# Patient Record
Sex: MALE | Race: WHITE | Employment: FULL TIME
[De-identification: names, ages, dates, MRNs, and addresses within clinical notes are randomized per-mention and may not be internally consistent; named-entity substitution may affect disease eponyms.]

---

## 2016-12-29 LAB
BUN BLDV-MCNC: 14 MG/DL
CALCIUM SERPL-MCNC: 9.8 MG/DL
CHLORIDE BLD-SCNC: 95 MMOL/L
CHOLESTEROL, TOTAL: 189 MG/DL
CHOLESTEROL/HDL RATIO: 3
CO2: 26 MMOL/L
CREAT SERPL-MCNC: 0.73 MG/DL
GFR CALCULATED: 60
GLUCOSE BLD-MCNC: 83 MG/DL
HDLC SERPL-MCNC: 64 MG/DL (ref 35–70)
LDL CHOLESTEROL CALCULATED: 102 MG/DL (ref 0–160)
POTASSIUM SERPL-SCNC: 3.6 MMOL/L
SODIUM BLD-SCNC: 137 MMOL/L
TRIGL SERPL-MCNC: 114 MG/DL
TSH SERPL DL<=0.05 MIU/L-ACNC: 0.12 UIU/ML
VLDLC SERPL CALC-MCNC: 23 MG/DL

## 2017-01-25 ENCOUNTER — OFFICE VISIT (OUTPATIENT)
Dept: FAMILY MEDICINE CLINIC | Facility: CLINIC | Age: 52
End: 2017-01-25

## 2017-01-25 VITALS
HEIGHT: 68 IN | SYSTOLIC BLOOD PRESSURE: 140 MMHG | HEART RATE: 70 BPM | BODY MASS INDEX: 29.25 KG/M2 | WEIGHT: 193 LBS | DIASTOLIC BLOOD PRESSURE: 90 MMHG | RESPIRATION RATE: 18 BRPM

## 2017-01-25 DIAGNOSIS — E78.2 MIXED HYPERLIPIDEMIA: ICD-10-CM

## 2017-01-25 DIAGNOSIS — E05.90 HYPERTHYROIDISM: ICD-10-CM

## 2017-01-25 DIAGNOSIS — I10 ESSENTIAL HYPERTENSION: Primary | ICD-10-CM

## 2017-01-25 DIAGNOSIS — M62.830 BACK SPASM: ICD-10-CM

## 2017-01-25 DIAGNOSIS — Z71.6 ENCOUNTER FOR SMOKING CESSATION COUNSELING: ICD-10-CM

## 2017-01-25 PROCEDURE — 99214 OFFICE O/P EST MOD 30 MIN: CPT | Performed by: NURSE PRACTITIONER

## 2017-01-25 RX ORDER — BENAZEPRIL HYDROCHLORIDE AND HYDROCHLOROTHIAZIDE 20; 12.5 MG/1; MG/1
1 TABLET ORAL DAILY
Qty: 180 TABLET | Refills: 1 | Status: SHIPPED | OUTPATIENT
Start: 2017-01-25 | End: 2017-01-25 | Stop reason: SDUPTHER

## 2017-01-25 RX ORDER — VARENICLINE TARTRATE 25 MG
KIT ORAL
Qty: 1 EACH | Refills: 0 | Status: SHIPPED | OUTPATIENT
Start: 2017-01-25 | End: 2017-03-01 | Stop reason: DRUGHIGH

## 2017-01-25 RX ORDER — IBUPROFEN 800 MG/1
800 TABLET ORAL EVERY 8 HOURS PRN
Qty: 180 TABLET | Refills: 1 | Status: SHIPPED | OUTPATIENT
Start: 2017-01-25 | End: 2017-05-25 | Stop reason: SDUPTHER

## 2017-01-25 RX ORDER — BENAZEPRIL HYDROCHLORIDE AND HYDROCHLOROTHIAZIDE 20; 12.5 MG/1; MG/1
1 TABLET ORAL 2 TIMES DAILY
Qty: 180 TABLET | Refills: 1 | Status: SHIPPED | OUTPATIENT
Start: 2017-01-25 | End: 2017-07-06 | Stop reason: SDUPTHER

## 2017-01-25 RX ORDER — METHOCARBAMOL 750 MG/1
750 TABLET, FILM COATED ORAL DAILY
Qty: 90 TABLET | Refills: 1 | Status: SHIPPED | OUTPATIENT
Start: 2017-01-25 | End: 2019-01-07 | Stop reason: SDUPTHER

## 2017-01-25 ASSESSMENT — ENCOUNTER SYMPTOMS
SHORTNESS OF BREATH: 0
EYES NEGATIVE: 1
ABDOMINAL DISTENTION: 0
CHEST TIGHTNESS: 0
COUGH: 0

## 2017-02-07 ENCOUNTER — TELEPHONE (OUTPATIENT)
Dept: FAMILY MEDICINE CLINIC | Facility: CLINIC | Age: 52
End: 2017-02-07

## 2017-03-01 ENCOUNTER — TELEPHONE (OUTPATIENT)
Dept: FAMILY MEDICINE CLINIC | Facility: CLINIC | Age: 52
End: 2017-03-01

## 2017-03-01 RX ORDER — VARENICLINE TARTRATE 1 MG/1
1 TABLET, FILM COATED ORAL 2 TIMES DAILY
Qty: 60 TABLET | Refills: 1 | Status: SHIPPED | OUTPATIENT
Start: 2017-03-01 | End: 2019-01-07

## 2017-03-20 ENCOUNTER — HOSPITAL ENCOUNTER (EMERGENCY)
Age: 52
Discharge: HOME | End: 2017-03-20
Attending: EMERGENCY MEDICINE
Payer: COMMERCIAL

## 2017-03-20 ENCOUNTER — APPOINTMENT (OUTPATIENT)
Dept: GENERAL RADIOLOGY | Age: 52
End: 2017-03-20
Payer: COMMERCIAL

## 2017-03-20 VITALS
SYSTOLIC BLOOD PRESSURE: 191 MMHG | WEIGHT: 191 LBS | TEMPERATURE: 98.4 F | OXYGEN SATURATION: 100 % | DIASTOLIC BLOOD PRESSURE: 104 MMHG | BODY MASS INDEX: 29.98 KG/M2 | HEIGHT: 67 IN | HEART RATE: 76 BPM | RESPIRATION RATE: 16 BRPM

## 2017-03-20 DIAGNOSIS — R07.81 RIB PAIN ON LEFT SIDE: Primary | ICD-10-CM

## 2017-03-20 LAB — D-DIMER QUANTITATIVE: 0.39 MG/L FEU (ref 0–0.5)

## 2017-03-20 PROCEDURE — 96372 THER/PROPH/DIAG INJ SC/IM: CPT

## 2017-03-20 PROCEDURE — 85379 FIBRIN DEGRADATION QUANT: CPT

## 2017-03-20 PROCEDURE — 99285 EMERGENCY DEPT VISIT HI MDM: CPT

## 2017-03-20 PROCEDURE — 6360000002 HC RX W HCPCS: Performed by: EMERGENCY MEDICINE

## 2017-03-20 PROCEDURE — 71020 XR CHEST STANDARD TWO VW: CPT

## 2017-03-20 PROCEDURE — 36415 COLL VENOUS BLD VENIPUNCTURE: CPT

## 2017-03-20 RX ORDER — KETOROLAC TROMETHAMINE 30 MG/ML
30 INJECTION, SOLUTION INTRAMUSCULAR; INTRAVENOUS ONCE
Status: COMPLETED | OUTPATIENT
Start: 2017-03-20 | End: 2017-03-20

## 2017-03-20 RX ORDER — OXYCODONE HYDROCHLORIDE AND ACETAMINOPHEN 5; 325 MG/1; MG/1
1 TABLET ORAL EVERY 6 HOURS PRN
Qty: 4 TABLET | Refills: 0 | Status: SHIPPED | OUTPATIENT
Start: 2017-03-20 | End: 2017-03-21

## 2017-03-20 RX ADMIN — KETOROLAC TROMETHAMINE 30 MG: 30 INJECTION, SOLUTION INTRAMUSCULAR at 15:34

## 2017-03-20 ASSESSMENT — ENCOUNTER SYMPTOMS
HEARTBURN: 0
EYES NEGATIVE: 1
NAUSEA: 0
COUGH: 0
BACK PAIN: 0
VOMITING: 0
STRIDOR: 0
CHOKING: 0
WHEEZING: 0
ALLERGIC/IMMUNOLOGIC NEGATIVE: 1
APNEA: 0
CHEST TIGHTNESS: 0
SHORTNESS OF BREATH: 0
TROUBLE SWALLOWING: 0
ABDOMINAL PAIN: 0
ORTHOPNEA: 0
GASTROINTESTINAL NEGATIVE: 1

## 2017-03-20 ASSESSMENT — PAIN DESCRIPTION - LOCATION: LOCATION: RIB CAGE

## 2017-03-20 ASSESSMENT — PAIN SCALES - GENERAL
PAINLEVEL_OUTOF10: 7
PAINLEVEL_OUTOF10: 6
PAINLEVEL_OUTOF10: 6

## 2017-03-20 ASSESSMENT — PAIN DESCRIPTION - PAIN TYPE: TYPE: ACUTE PAIN

## 2017-03-20 ASSESSMENT — PAIN DESCRIPTION - FREQUENCY: FREQUENCY: CONTINUOUS

## 2017-03-20 ASSESSMENT — PAIN DESCRIPTION - PROGRESSION: CLINICAL_PROGRESSION: NOT CHANGED

## 2017-03-20 ASSESSMENT — PAIN DESCRIPTION - ORIENTATION: ORIENTATION: LEFT

## 2017-03-20 ASSESSMENT — PAIN DESCRIPTION - DESCRIPTORS: DESCRIPTORS: ACHING

## 2017-04-18 ENCOUNTER — OFFICE VISIT (OUTPATIENT)
Dept: FAMILY MEDICINE CLINIC | Age: 52
End: 2017-04-18
Payer: COMMERCIAL

## 2017-04-18 VITALS
WEIGHT: 192 LBS | DIASTOLIC BLOOD PRESSURE: 80 MMHG | HEIGHT: 68 IN | HEART RATE: 68 BPM | BODY MASS INDEX: 29.1 KG/M2 | RESPIRATION RATE: 18 BRPM | SYSTOLIC BLOOD PRESSURE: 130 MMHG

## 2017-04-18 DIAGNOSIS — R07.81 RIB PAIN ON LEFT SIDE: ICD-10-CM

## 2017-04-18 DIAGNOSIS — R07.89 COSTOCHONDRAL CHEST PAIN: Primary | ICD-10-CM

## 2017-04-18 PROCEDURE — 99213 OFFICE O/P EST LOW 20 MIN: CPT | Performed by: NURSE PRACTITIONER

## 2017-04-18 ASSESSMENT — ENCOUNTER SYMPTOMS
ABDOMINAL DISTENTION: 0
COUGH: 0
EYES NEGATIVE: 1

## 2017-10-26 ENCOUNTER — TELEPHONE (OUTPATIENT)
Dept: PRIMARY CARE CLINIC | Age: 52
End: 2017-10-26

## 2018-01-03 DIAGNOSIS — I10 ESSENTIAL HYPERTENSION: ICD-10-CM

## 2018-01-03 RX ORDER — BENAZEPRIL HYDROCHLORIDE AND HYDROCHLOROTHIAZIDE 20; 12.5 MG/1; MG/1
TABLET ORAL
Qty: 180 TABLET | Refills: 1 | Status: SHIPPED | OUTPATIENT
Start: 2018-01-03 | End: 2018-07-02 | Stop reason: SDUPTHER

## 2018-07-02 DIAGNOSIS — I10 ESSENTIAL HYPERTENSION: ICD-10-CM

## 2018-07-02 RX ORDER — BENAZEPRIL HYDROCHLORIDE AND HYDROCHLOROTHIAZIDE 20; 12.5 MG/1; MG/1
TABLET ORAL
Qty: 180 TABLET | Refills: 1 | Status: SHIPPED | OUTPATIENT
Start: 2018-07-02 | End: 2019-01-07 | Stop reason: SDUPTHER

## 2019-01-07 ENCOUNTER — OFFICE VISIT (OUTPATIENT)
Dept: FAMILY MEDICINE CLINIC | Age: 54
End: 2019-01-07
Payer: COMMERCIAL

## 2019-01-07 VITALS
TEMPERATURE: 98.7 F | HEART RATE: 84 BPM | DIASTOLIC BLOOD PRESSURE: 110 MMHG | WEIGHT: 209 LBS | HEIGHT: 68 IN | OXYGEN SATURATION: 97 % | BODY MASS INDEX: 31.67 KG/M2 | SYSTOLIC BLOOD PRESSURE: 170 MMHG

## 2019-01-07 DIAGNOSIS — Z12.11 ENCOUNTER FOR SCREENING COLONOSCOPY: ICD-10-CM

## 2019-01-07 DIAGNOSIS — M62.830 BACK SPASM: ICD-10-CM

## 2019-01-07 DIAGNOSIS — D23.30 DERMOID CYST OF FACE: ICD-10-CM

## 2019-01-07 DIAGNOSIS — I10 ESSENTIAL HYPERTENSION: ICD-10-CM

## 2019-01-07 DIAGNOSIS — J20.9 BRONCHITIS, ACUTE, WITH BRONCHOSPASM: Primary | ICD-10-CM

## 2019-01-07 PROCEDURE — 99214 OFFICE O/P EST MOD 30 MIN: CPT | Performed by: NURSE PRACTITIONER

## 2019-01-07 RX ORDER — AMOXICILLIN AND CLAVULANATE POTASSIUM 875; 125 MG/1; MG/1
1 TABLET, FILM COATED ORAL 2 TIMES DAILY
Qty: 20 TABLET | Refills: 0 | Status: SHIPPED | OUTPATIENT
Start: 2019-01-07 | End: 2019-01-17

## 2019-01-07 RX ORDER — MELOXICAM 15 MG/1
15 TABLET ORAL DAILY
Qty: 30 TABLET | Refills: 0 | Status: SHIPPED | OUTPATIENT
Start: 2019-01-07 | End: 2019-04-11 | Stop reason: SDUPTHER

## 2019-01-07 RX ORDER — METHOCARBAMOL 750 MG/1
750 TABLET, FILM COATED ORAL DAILY
Qty: 90 TABLET | Refills: 1 | Status: SHIPPED | OUTPATIENT
Start: 2019-01-07 | End: 2020-12-17 | Stop reason: SDUPTHER

## 2019-01-07 RX ORDER — AMLODIPINE BESYLATE 5 MG/1
5 TABLET ORAL DAILY
Qty: 30 TABLET | Refills: 1 | Status: SHIPPED | OUTPATIENT
Start: 2019-01-07 | End: 2019-04-11 | Stop reason: SDUPTHER

## 2019-01-07 RX ORDER — BENAZEPRIL HYDROCHLORIDE AND HYDROCHLOROTHIAZIDE 20; 12.5 MG/1; MG/1
1 TABLET ORAL DAILY
Qty: 90 TABLET | Refills: 1 | Status: SHIPPED | OUTPATIENT
Start: 2019-01-07 | End: 2019-07-15 | Stop reason: SDUPTHER

## 2019-01-08 ASSESSMENT — ENCOUNTER SYMPTOMS
COUGH: 0
ABDOMINAL DISTENTION: 0
CHEST TIGHTNESS: 0
EYES NEGATIVE: 1
SORE THROAT: 0
RHINORRHEA: 0

## 2019-02-21 LAB
ALT SERPL-CCNC: 113 U/L
AST SERPL-CCNC: 81 U/L
AVERAGE GLUCOSE: 105
BASOPHILS ABSOLUTE: NORMAL /ΜL
BASOPHILS RELATIVE PERCENT: NORMAL %
BUN BLDV-MCNC: 13 MG/DL
CALCIUM SERPL-MCNC: 9.7 MG/DL
CHLORIDE BLD-SCNC: 100 MMOL/L
CHOLESTEROL, TOTAL: 269 MG/DL
CHOLESTEROL/HDL RATIO: 5.6
CO2: 26 MMOL/L
CREAT SERPL-MCNC: 0.82 MG/DL
EOSINOPHILS ABSOLUTE: NORMAL /ΜL
EOSINOPHILS RELATIVE PERCENT: NORMAL %
GFR CALCULATED: ABNORMAL
GLUCOSE BLD-MCNC: 115 MG/DL
HBA1C MFR BLD: 5.3 %
HCT VFR BLD CALC: 48.6 % (ref 41–53)
HDLC SERPL-MCNC: 48 MG/DL (ref 35–70)
HEMOGLOBIN: 16.4 G/DL (ref 13.5–17.5)
LDL CHOLESTEROL CALCULATED: 185 MG/DL (ref 0–160)
LYMPHOCYTES ABSOLUTE: NORMAL /ΜL
LYMPHOCYTES RELATIVE PERCENT: NORMAL %
MCH RBC QN AUTO: 30.5 PG
MCHC RBC AUTO-ENTMCNC: 33.7 G/DL
MCV RBC AUTO: 90.6 FL
MONOCYTES ABSOLUTE: NORMAL /ΜL
MONOCYTES RELATIVE PERCENT: NORMAL %
NEUTROPHILS ABSOLUTE: NORMAL /ΜL
NEUTROPHILS RELATIVE PERCENT: NORMAL %
PLATELET # BLD: 259 K/ΜL
PMV BLD AUTO: NORMAL FL
POTASSIUM SERPL-SCNC: 4.2 MMOL/L
PROSTATE SPECIFIC ANTIGEN: 1.55 NG/ML
RBC # BLD: NORMAL 10^6/ΜL
SODIUM BLD-SCNC: 139 MMOL/L
TRIGL SERPL-MCNC: 180 MG/DL
TSH SERPL DL<=0.05 MIU/L-ACNC: 0.61 UIU/ML
VLDLC SERPL CALC-MCNC: ABNORMAL MG/DL
WBC # BLD: 6.7 10^3/ML

## 2019-04-10 ENCOUNTER — TELEPHONE (OUTPATIENT)
Dept: FAMILY MEDICINE CLINIC | Age: 54
End: 2019-04-10

## 2019-04-11 RX ORDER — MELOXICAM 15 MG/1
15 TABLET ORAL DAILY
Qty: 90 TABLET | Refills: 0 | Status: SHIPPED | OUTPATIENT
Start: 2019-04-11 | End: 2019-07-15

## 2019-04-11 RX ORDER — AMLODIPINE BESYLATE 5 MG/1
5 TABLET ORAL DAILY
Qty: 90 TABLET | Refills: 1 | Status: SHIPPED | OUTPATIENT
Start: 2019-04-11 | End: 2019-07-16 | Stop reason: SDUPTHER

## 2019-06-21 ENCOUNTER — TELEPHONE (OUTPATIENT)
Dept: PRIMARY CARE CLINIC | Age: 54
End: 2019-06-21

## 2019-06-21 DIAGNOSIS — Z00.00 WELLNESS EXAMINATION: Primary | ICD-10-CM

## 2019-06-21 DIAGNOSIS — Z00.00 WELLNESS EXAMINATION: ICD-10-CM

## 2019-07-15 ENCOUNTER — HOSPITAL ENCOUNTER (OUTPATIENT)
Dept: GENERAL RADIOLOGY | Age: 54
Discharge: HOME | End: 2019-07-17
Payer: COMMERCIAL

## 2019-07-15 ENCOUNTER — HOSPITAL ENCOUNTER (OUTPATIENT)
Age: 54
Discharge: HOME | End: 2019-07-17
Payer: COMMERCIAL

## 2019-07-15 ENCOUNTER — HOSPITAL ENCOUNTER (OUTPATIENT)
Age: 54
Discharge: HOME | End: 2019-07-15
Payer: COMMERCIAL

## 2019-07-15 ENCOUNTER — OFFICE VISIT (OUTPATIENT)
Dept: FAMILY MEDICINE CLINIC | Age: 54
End: 2019-07-15
Payer: COMMERCIAL

## 2019-07-15 VITALS
BODY MASS INDEX: 30.96 KG/M2 | TEMPERATURE: 98.8 F | WEIGHT: 203.6 LBS | SYSTOLIC BLOOD PRESSURE: 180 MMHG | HEART RATE: 82 BPM | OXYGEN SATURATION: 96 % | DIASTOLIC BLOOD PRESSURE: 100 MMHG

## 2019-07-15 DIAGNOSIS — M51.36 DDD (DEGENERATIVE DISC DISEASE), LUMBAR: ICD-10-CM

## 2019-07-15 DIAGNOSIS — M54.41 MIDLINE LOW BACK PAIN WITH BILATERAL SCIATICA, UNSPECIFIED CHRONICITY: ICD-10-CM

## 2019-07-15 DIAGNOSIS — I16.0 HYPERTENSIVE URGENCY: ICD-10-CM

## 2019-07-15 DIAGNOSIS — M47.817 FACET ARTHROPATHY, LUMBOSACRAL: ICD-10-CM

## 2019-07-15 DIAGNOSIS — M54.42 MIDLINE LOW BACK PAIN WITH BILATERAL SCIATICA, UNSPECIFIED CHRONICITY: ICD-10-CM

## 2019-07-15 DIAGNOSIS — R79.89 LOW TSH LEVEL: ICD-10-CM

## 2019-07-15 DIAGNOSIS — Z12.11 ENCOUNTER FOR SCREENING COLONOSCOPY: ICD-10-CM

## 2019-07-15 DIAGNOSIS — I10 ESSENTIAL HYPERTENSION: ICD-10-CM

## 2019-07-15 DIAGNOSIS — F10.10 MILD ALCOHOL USE DISORDER, IN CONTROLLED ENVIRONMENT: ICD-10-CM

## 2019-07-15 DIAGNOSIS — Z00.00 ANNUAL PHYSICAL EXAM: ICD-10-CM

## 2019-07-15 DIAGNOSIS — M62.830 BACK SPASM: ICD-10-CM

## 2019-07-15 DIAGNOSIS — E04.1 LEFT THYROID NODULE: ICD-10-CM

## 2019-07-15 DIAGNOSIS — Z00.00 ANNUAL PHYSICAL EXAM: Primary | ICD-10-CM

## 2019-07-15 LAB
ALBUMIN SERPL-MCNC: 4.8 G/DL (ref 3.5–5.2)
ALBUMIN/GLOBULIN RATIO: ABNORMAL (ref 1–2.5)
ALP BLD-CCNC: 66 U/L (ref 40–129)
ALT SERPL-CCNC: 83 U/L (ref 5–41)
AST SERPL-CCNC: 58 U/L
BILIRUB SERPL-MCNC: 1.05 MG/DL (ref 0.3–1.2)
BILIRUBIN DIRECT: 0.25 MG/DL
BILIRUBIN, INDIRECT: 0.8 MG/DL (ref 0–1)
GLOBULIN: ABNORMAL G/DL (ref 1.5–3.8)
TOTAL PROTEIN: 7.9 G/DL (ref 6.4–8.3)

## 2019-07-15 PROCEDURE — 83520 IMMUNOASSAY QUANT NOS NONAB: CPT

## 2019-07-15 PROCEDURE — 99214 OFFICE O/P EST MOD 30 MIN: CPT | Performed by: NURSE PRACTITIONER

## 2019-07-15 PROCEDURE — 72110 X-RAY EXAM L-2 SPINE 4/>VWS: CPT

## 2019-07-15 PROCEDURE — 36415 COLL VENOUS BLD VENIPUNCTURE: CPT

## 2019-07-15 PROCEDURE — 93005 ELECTROCARDIOGRAM TRACING: CPT

## 2019-07-15 PROCEDURE — 80076 HEPATIC FUNCTION PANEL: CPT

## 2019-07-15 PROCEDURE — 86376 MICROSOMAL ANTIBODY EACH: CPT

## 2019-07-15 RX ORDER — METHOCARBAMOL 750 MG/1
750 TABLET, FILM COATED ORAL DAILY
Qty: 90 TABLET | Refills: 1 | Status: CANCELLED | OUTPATIENT
Start: 2019-07-15

## 2019-07-15 RX ORDER — BENAZEPRIL HYDROCHLORIDE AND HYDROCHLOROTHIAZIDE 20; 12.5 MG/1; MG/1
1 TABLET ORAL DAILY
Qty: 90 TABLET | Refills: 1 | Status: SHIPPED | OUTPATIENT
Start: 2019-07-15 | End: 2019-12-31

## 2019-07-15 RX ORDER — AMLODIPINE BESYLATE 5 MG/1
5 TABLET ORAL DAILY
Qty: 90 TABLET | Refills: 1 | Status: CANCELLED | OUTPATIENT
Start: 2019-07-15

## 2019-07-15 RX ORDER — IBUPROFEN 800 MG/1
TABLET ORAL
Qty: 180 TABLET | Refills: 1 | Status: CANCELLED | OUTPATIENT
Start: 2019-07-15

## 2019-07-15 ASSESSMENT — PATIENT HEALTH QUESTIONNAIRE - PHQ9
SUM OF ALL RESPONSES TO PHQ9 QUESTIONS 1 & 2: 0
2. FEELING DOWN, DEPRESSED OR HOPELESS: 0
SUM OF ALL RESPONSES TO PHQ QUESTIONS 1-9: 0
SUM OF ALL RESPONSES TO PHQ QUESTIONS 1-9: 0
1. LITTLE INTEREST OR PLEASURE IN DOING THINGS: 0
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
## 2019-07-16 LAB
EKG ATRIAL RATE: 64 BPM
EKG P AXIS: 42 DEGREES
EKG P-R INTERVAL: 132 MS
EKG Q-T INTERVAL: 390 MS
EKG QRS DURATION: 116 MS
EKG QTC CALCULATION (BAZETT): 402 MS
EKG R AXIS: 52 DEGREES
EKG T AXIS: 63 DEGREES
EKG VENTRICULAR RATE: 64 BPM
THYROID PEROXIDASE (TPO) AB: <10 IU/ML (ref 0–35)

## 2019-07-16 PROCEDURE — 93010 ELECTROCARDIOGRAM REPORT: CPT | Performed by: INTERNAL MEDICINE

## 2019-07-16 RX ORDER — AMLODIPINE BESYLATE 5 MG/1
5 TABLET ORAL DAILY
Qty: 30 TABLET | Refills: 1 | Status: SHIPPED | OUTPATIENT
Start: 2019-07-16 | End: 2019-08-23 | Stop reason: ALTCHOICE

## 2019-07-16 ASSESSMENT — ENCOUNTER SYMPTOMS
SINUS PAIN: 0
BACK PAIN: 1
ABDOMINAL DISTENTION: 0
CHEST TIGHTNESS: 0
COUGH: 0

## 2019-07-17 LAB — TSH RECEPTOR AB: <0.9 IU/L

## 2019-07-18 ENCOUNTER — TELEPHONE (OUTPATIENT)
Dept: PAIN MANAGEMENT | Age: 54
End: 2019-07-18

## 2019-07-31 NOTE — TELEPHONE ENCOUNTER
Ok to yohan
activity, return to work, decrease pain) Make it feel better  Please remind patient to bring ALL medications into clinic appointment.

## 2019-08-02 ENCOUNTER — HOSPITAL ENCOUNTER (OUTPATIENT)
Dept: ULTRASOUND IMAGING | Age: 54
Discharge: HOME | End: 2019-08-04
Payer: COMMERCIAL

## 2019-08-02 DIAGNOSIS — E04.1 LEFT THYROID NODULE: ICD-10-CM

## 2019-08-02 DIAGNOSIS — R79.89 LOW TSH LEVEL: ICD-10-CM

## 2019-08-02 DIAGNOSIS — Z00.00 ANNUAL PHYSICAL EXAM: ICD-10-CM

## 2019-08-02 PROCEDURE — 76536 US EXAM OF HEAD AND NECK: CPT

## 2019-08-06 DIAGNOSIS — I10 ESSENTIAL HYPERTENSION: ICD-10-CM

## 2019-08-06 DIAGNOSIS — R79.89 LOW TSH LEVEL: ICD-10-CM

## 2019-08-06 DIAGNOSIS — E04.1 THYROID CYST: Primary | ICD-10-CM

## 2019-08-06 DIAGNOSIS — F10.10 MILD ALCOHOL USE DISORDER, IN CONTROLLED ENVIRONMENT: ICD-10-CM

## 2019-08-07 ENCOUNTER — NURSE ONLY (OUTPATIENT)
Dept: FAMILY MEDICINE CLINIC | Age: 54
End: 2019-08-07

## 2019-08-07 VITALS — DIASTOLIC BLOOD PRESSURE: 90 MMHG | SYSTOLIC BLOOD PRESSURE: 142 MMHG | HEART RATE: 75 BPM

## 2019-08-08 NOTE — PROGRESS NOTES
bp elevated still better but not to goal yet. norvasc 5 mg (amlodipine) increase to 10 mg dose daily. (take 2 pills daily please)    Continue on lisinopril/hctz too. Recheck bp in 1-2 weeks.    Thyroid ultrasound is abnormal(see result note) needs ENT appt referral placed        thanks

## 2019-08-09 NOTE — PROGRESS NOTES
Spoke with patient and advised on medication change, patient verbalized understanding. Patient will stop in office in 1-2 weeks for bp recheck.

## 2019-08-17 ENCOUNTER — HOSPITAL ENCOUNTER (OUTPATIENT)
Age: 54
Discharge: HOME | End: 2019-08-17
Payer: COMMERCIAL

## 2019-08-17 DIAGNOSIS — E04.1 THYROID CYST: ICD-10-CM

## 2019-08-17 DIAGNOSIS — I10 ESSENTIAL HYPERTENSION: ICD-10-CM

## 2019-08-17 DIAGNOSIS — F10.10 MILD ALCOHOL USE DISORDER, IN CONTROLLED ENVIRONMENT: ICD-10-CM

## 2019-08-17 LAB
ANION GAP SERPL CALCULATED.3IONS-SCNC: 13 MMOL/L (ref 9–17)
BUN BLDV-MCNC: 14 MG/DL (ref 6–20)
BUN/CREAT BLD: 20 (ref 9–20)
CALCIUM SERPL-MCNC: 10.6 MG/DL (ref 8.6–10.4)
CHLORIDE BLD-SCNC: 99 MMOL/L (ref 98–107)
CO2: 27 MMOL/L (ref 20–31)
CREAT SERPL-MCNC: 0.7 MG/DL (ref 0.7–1.2)
GFR AFRICAN AMERICAN: >60 ML/MIN
GFR NON-AFRICAN AMERICAN: >60 ML/MIN
GFR SERPL CREATININE-BSD FRML MDRD: ABNORMAL ML/MIN/{1.73_M2}
GFR SERPL CREATININE-BSD FRML MDRD: ABNORMAL ML/MIN/{1.73_M2}
GLUCOSE BLD-MCNC: 97 MG/DL (ref 70–99)
POTASSIUM SERPL-SCNC: 3.7 MMOL/L (ref 3.7–5.3)
SODIUM BLD-SCNC: 139 MMOL/L (ref 135–144)
T3 FREE: 3.78 PG/ML (ref 2.02–4.43)
TSH SERPL DL<=0.05 MIU/L-ACNC: 0.45 MIU/L (ref 0.3–5)

## 2019-08-17 PROCEDURE — 82308 ASSAY OF CALCITONIN: CPT

## 2019-08-17 PROCEDURE — 86376 MICROSOMAL ANTIBODY EACH: CPT

## 2019-08-17 PROCEDURE — 80048 BASIC METABOLIC PNL TOTAL CA: CPT

## 2019-08-17 PROCEDURE — 36415 COLL VENOUS BLD VENIPUNCTURE: CPT

## 2019-08-17 PROCEDURE — 84443 ASSAY THYROID STIM HORMONE: CPT

## 2019-08-17 PROCEDURE — 84481 FREE ASSAY (FT-3): CPT

## 2019-08-19 LAB — THYROID PEROXIDASE (TPO) AB: <10 IU/ML (ref 0–35)

## 2019-08-20 LAB — CALCITONIN LEVEL: 2.4 PG/ML (ref 0–7.5)

## 2019-08-21 ENCOUNTER — OFFICE VISIT (OUTPATIENT)
Dept: ENT CLINIC | Age: 54
End: 2019-08-21
Payer: COMMERCIAL

## 2019-08-21 VITALS
HEART RATE: 80 BPM | RESPIRATION RATE: 18 BRPM | SYSTOLIC BLOOD PRESSURE: 144 MMHG | WEIGHT: 204 LBS | BODY MASS INDEX: 30.92 KG/M2 | DIASTOLIC BLOOD PRESSURE: 82 MMHG | TEMPERATURE: 98.4 F | HEIGHT: 68 IN

## 2019-08-21 DIAGNOSIS — E04.1 THYROID NODULE: Primary | ICD-10-CM

## 2019-08-21 PROCEDURE — 99243 OFF/OP CNSLTJ NEW/EST LOW 30: CPT | Performed by: OTOLARYNGOLOGY

## 2019-08-21 ASSESSMENT — ENCOUNTER SYMPTOMS
FACIAL SWELLING: 0
PHOTOPHOBIA: 0
NAUSEA: 0
COLOR CHANGE: 0
EYE PAIN: 0
BACK PAIN: 1
CONSTIPATION: 0
TROUBLE SWALLOWING: 0
BLOOD IN STOOL: 0
RECTAL PAIN: 0
APNEA: 0
CHOKING: 0
VOMITING: 0
SINUS PRESSURE: 0
VOICE CHANGE: 0
DIARRHEA: 0
STRIDOR: 0
CHEST TIGHTNESS: 0
WHEEZING: 0
SINUS PAIN: 0
EYE REDNESS: 0
SORE THROAT: 0
EYE DISCHARGE: 0
EYE ITCHING: 0
SHORTNESS OF BREATH: 0
ANAL BLEEDING: 0
ABDOMINAL PAIN: 0
COUGH: 0
ABDOMINAL DISTENTION: 0
RHINORRHEA: 0

## 2019-08-21 NOTE — PROGRESS NOTES
strength    Sinuses: no sinus tenderness    Salivary Glands: no enlargements of parotid glands, no tenderness of parotid glands, no masses of parotid glands, clear salivary flow on palpation from Stensen's ducts, no duct stones of Stensen's duct, no enlargement of submandibular glands, no tenderness of submandibular glands, no masses of submandibular glands, clear salivary flow from Hurley's ducts, no stones of Hurley's ducts    Temporomandibular Joint: no crepitus with motion, no tenderness on palpation, no trismus, motion symmetric    EYES:    Pupils: PERRLA, extra-ocular movements intact, no nystagmus, sclera white, no redness of eyes, no watering of eyes    EARS:    Bilateral External Ears: no pits, no tags    Right External Ear: normally formed, no lesions, no mastoid tenderness    Left External Ear: normally formed, no lesions, no mastoid tenderness    Right External Auditory Canal: normal, healthy skin, no obstructing cerumen, no discharge    Left External Auditory Canal: normal, healthy skin, no obstructing cerumen, no discharge    Right Tympanic Membrane: normal landmarks, translucent, mobile to pneumatic otoscopy, no perforation    Left Tympanic Membrane: normal landmarks, translucent, mobile to pneumatic otoscopy, no perforation    Hearing: intact to spoken voice, intact to finger rub, Haile midline    NOSE:    Nasal Skin: no lesions, no lacerations, no scars    Nasal Dorsum: symmetric with no visible or palpable deformities    Nasal Tip: normal symmetric nasal tip, normal nasal valves    Nasal Mucosa: normal, pink and moist    Septum: not markedly deformed, midline, no exposed vessels, no bleeding, no septal granuloma    Turbinates: normal size and conformation    Nasopharynx: normal    ORAL CAVITY/MOUTH:    Lips, teeth, gums: normal lips, normal gums, dentition intact, no dental pain on palpation    Oral Mucosa: normal, moist, no lesions    Palate: normal hard palate, normal soft palate, symmetric palatal elevation    Floor of Mouth: normal floor of mouth    Tongue: normal tongue, no lesions, no edema, no masses, normal mucosa, mobile    Tonsils: normal tonsils, symmetric, no lesions    Posterior pharynx: normal    NECK:    Neck: no masses, trachea midline, normal range of motion, no cysts or pits, no tenderness to palpation    Thyroid: normal thyroid, no enlargement, no tenderness, 1.5 cm palpable nodule low in left lobe when elevates with swallow    LYMPH NODES:    Cervical: no palpable lymph node enlargement    SKIN:    General Appearance: no lesions, warm and dry, normal turgor, no bruising    NEUROLOGICAL SYSTEM:    Orientation: oriented to time, oriented to place, oriented to person    PSYCHIATRIC:    Mood and affect: normal mood, normal affect            Assessment and Plan:      Low risk thyroid nodule and serial ultrasound follow-up alone is advised at this time. Ultrasound findings reviewed confirming left thyroid nodule. Diagnosis Orders   1. Thyroid nodule          The patient and/or caregiver is to notify the office if no improvement or worsening of symptoms is noted prior to the scheduled follow-up for sooner evaluation. The patient and/or caregiver is able to state an understanding of these recommendations and is agreeable to the treatment plan.

## 2019-08-21 NOTE — PROGRESS NOTES
Review of Systems   Constitutional: Negative for activity change, appetite change, chills, diaphoresis, fatigue, fever and unexpected weight change. HENT: Negative for congestion, dental problem, drooling, ear discharge, ear pain, facial swelling, hearing loss, mouth sores, nosebleeds, postnasal drip, rhinorrhea, sinus pressure, sinus pain, sneezing, sore throat, tinnitus, trouble swallowing and voice change. Eyes: Negative for photophobia, pain, discharge, redness, itching and visual disturbance. Respiratory: Negative for apnea, cough, choking, chest tightness, shortness of breath, wheezing and stridor. Cardiovascular: Negative for chest pain, palpitations and leg swelling. Gastrointestinal: Negative for abdominal distention, abdominal pain, anal bleeding, blood in stool, constipation, diarrhea, nausea, rectal pain and vomiting. Endocrine: Negative for cold intolerance, heat intolerance, polydipsia, polyphagia and polyuria. Genitourinary: Negative for decreased urine volume, difficulty urinating, discharge, dysuria, enuresis, flank pain, frequency, genital sores, hematuria, penile pain, penile swelling, scrotal swelling, testicular pain and urgency. Musculoskeletal: Positive for back pain. Negative for arthralgias, gait problem, joint swelling, myalgias, neck pain and neck stiffness. Skin: Negative for color change, pallor, rash and wound. Allergic/Immunologic: Negative for environmental allergies, food allergies and immunocompromised state. Neurological: Negative for dizziness, tremors, seizures, syncope, facial asymmetry, speech difficulty, weakness, light-headedness, numbness and headaches. Hematological: Negative for adenopathy. Does not bruise/bleed easily. Psychiatric/Behavioral: Negative for agitation, behavioral problems, confusion, decreased concentration, dysphoric mood, hallucinations, self-injury, sleep disturbance and suicidal ideas.  The patient is not nervous/anxious and is

## 2019-08-22 ENCOUNTER — NURSE ONLY (OUTPATIENT)
Dept: FAMILY MEDICINE CLINIC | Age: 54
End: 2019-08-22

## 2019-08-22 ENCOUNTER — TELEPHONE (OUTPATIENT)
Dept: FAMILY MEDICINE CLINIC | Age: 54
End: 2019-08-22

## 2019-08-22 VITALS — DIASTOLIC BLOOD PRESSURE: 86 MMHG | SYSTOLIC BLOOD PRESSURE: 138 MMHG

## 2019-08-22 DIAGNOSIS — Z01.30 BP CHECK: Primary | ICD-10-CM

## 2019-08-22 NOTE — TELEPHONE ENCOUNTER
Pt states that he is almost out of the 5 mg tabs so if you want him to continue on the 10mg he will need the new rx sent to [address] for a 30 day and then mail order 6month supply   Please review bps and advise   Pt would like a call if you are keeping him on the 10 of the amlodipine and the combo med or if you would like to change it.

## 2019-08-27 ENCOUNTER — NURSE ONLY (OUTPATIENT)
Dept: FAMILY MEDICINE CLINIC | Age: 54
End: 2019-08-27

## 2019-08-27 VITALS — SYSTOLIC BLOOD PRESSURE: 132 MMHG | DIASTOLIC BLOOD PRESSURE: 82 MMHG

## 2019-08-27 DIAGNOSIS — Z01.30 BP CHECK: Primary | ICD-10-CM

## 2019-08-30 ENCOUNTER — NURSE ONLY (OUTPATIENT)
Dept: FAMILY MEDICINE CLINIC | Age: 54
End: 2019-08-30

## 2019-08-30 VITALS — DIASTOLIC BLOOD PRESSURE: 88 MMHG | SYSTOLIC BLOOD PRESSURE: 134 MMHG

## 2019-08-30 DIAGNOSIS — Z01.30 BP CHECK: Primary | ICD-10-CM

## 2019-09-05 ENCOUNTER — NURSE ONLY (OUTPATIENT)
Dept: FAMILY MEDICINE CLINIC | Age: 54
End: 2019-09-05

## 2019-09-05 VITALS — DIASTOLIC BLOOD PRESSURE: 90 MMHG | SYSTOLIC BLOOD PRESSURE: 142 MMHG

## 2019-09-05 DIAGNOSIS — Z01.30 BLOOD PRESSURE CHECK: Primary | ICD-10-CM

## 2019-12-31 DIAGNOSIS — I10 ESSENTIAL HYPERTENSION: ICD-10-CM

## 2019-12-31 RX ORDER — BENAZEPRIL HYDROCHLORIDE AND HYDROCHLOROTHIAZIDE 20; 12.5 MG/1; MG/1
TABLET ORAL
Qty: 90 TABLET | Refills: 1 | Status: SHIPPED | OUTPATIENT
Start: 2019-12-31 | End: 2020-01-03 | Stop reason: SDUPTHER

## 2020-01-03 RX ORDER — BENAZEPRIL HYDROCHLORIDE AND HYDROCHLOROTHIAZIDE 20; 12.5 MG/1; MG/1
TABLET ORAL
Qty: 60 TABLET | Refills: 0 | Status: SHIPPED | OUTPATIENT
Start: 2020-01-03 | End: 2020-07-09 | Stop reason: SDUPTHER

## 2020-01-03 RX ORDER — BENAZEPRIL HYDROCHLORIDE AND HYDROCHLOROTHIAZIDE 20; 12.5 MG/1; MG/1
1 TABLET ORAL 2 TIMES DAILY
Qty: 180 TABLET | Refills: 1 | Status: SHIPPED | OUTPATIENT
Start: 2020-01-03 | End: 2020-02-10

## 2020-01-03 NOTE — TELEPHONE ENCOUNTER
Pt going to run out of medication before mail order will get there. Also medication sent to mail order is not correct amount, so prescription resent.       Has appt on jan 22nd

## 2020-02-10 ENCOUNTER — OFFICE VISIT (OUTPATIENT)
Dept: FAMILY MEDICINE CLINIC | Age: 55
End: 2020-02-10
Payer: COMMERCIAL

## 2020-02-10 VITALS
SYSTOLIC BLOOD PRESSURE: 172 MMHG | OXYGEN SATURATION: 97 % | HEART RATE: 67 BPM | BODY MASS INDEX: 32.39 KG/M2 | DIASTOLIC BLOOD PRESSURE: 100 MMHG | WEIGHT: 213 LBS

## 2020-02-10 PROCEDURE — 99214 OFFICE O/P EST MOD 30 MIN: CPT | Performed by: NURSE PRACTITIONER

## 2020-02-10 RX ORDER — AMLODIPINE BESYLATE 10 MG/1
10 TABLET ORAL DAILY
Qty: 30 TABLET | Refills: 2 | Status: SHIPPED | OUTPATIENT
Start: 2020-02-10 | End: 2020-06-26 | Stop reason: DRUGHIGH

## 2020-02-10 ASSESSMENT — PATIENT HEALTH QUESTIONNAIRE - PHQ9
1. LITTLE INTEREST OR PLEASURE IN DOING THINGS: 0
SUM OF ALL RESPONSES TO PHQ QUESTIONS 1-9: 0
SUM OF ALL RESPONSES TO PHQ9 QUESTIONS 1 & 2: 0
SUM OF ALL RESPONSES TO PHQ QUESTIONS 1-9: 0
2. FEELING DOWN, DEPRESSED OR HOPELESS: 0

## 2020-02-10 ASSESSMENT — ENCOUNTER SYMPTOMS
BLURRED VISION: 0
ABDOMINAL DISTENTION: 0
SHORTNESS OF BREATH: 0
EYES NEGATIVE: 1
WHEEZING: 0
ABDOMINAL PAIN: 0
CHEST TIGHTNESS: 0
COUGH: 0
BACK PAIN: 1

## 2020-02-10 NOTE — PROGRESS NOTES
11/05/2013     Lab Results   Component Value Date    CHOLHDLRATIO 5.6 (H) 02/21/2019    CHOLHDLRATIO 3.0 12/29/2016    CHOLHDLRATIO 4.1 11/13/2014       Stress from dad in nursing home with heart and kidney failure, hx CHF, atial fib    8/2/2019 thyroid ultrasound     Impression       TI-RADS 3, 1.6 cm nodule inferior left lobe. This nodule is elliptical,    well-defined with no internal echo structures.       These nodules should be followed if greater than or equal to 1.5 cm but do not    meet criteria for biopsy until they are greater than 2.5 cm.       Consider 1 year follow-up.       The American College of [employer] TI-RADS committee's white paper    recommendations for thyroid lesions classified as TR3 (mildly suspicious) are    listed below:   > 1.5 cm. Follow-up ultrasound in 1, 3, and 5 years.   > 2.5 cm.  FNA.     Gabriel Negretever [facility] [id number].               The 10-year ASCVD risk score (Maryam Maria., et al., 2013) is: 16.7%    Values used to calculate the score:      Age: 62 years      Sex: Male      Is Non- : No      Diabetic: No      Tobacco smoker: No      Systolic Blood Pressure: 034 mmHg      Is BP treated: Yes      HDL Cholesterol: 48 mg/dL      Total Cholesterol: 269 mg/dL    Past Medical History:   Diagnosis Date    Hx of exercise stress test 2008    negative    Hyperlipidemia     LDL goal 100    Hypertension       Past Surgical History:   Procedure Laterality Date    CYST REMOVAL      Left wrist       Family History   Problem Relation Age of Onset    Diabetes Mother         DM II    High Cholesterol Mother     High Blood Pressure Mother     Stroke Mother 61    Heart Disease Mother         CAD with stents    Cancer Mother 68        bladder CA with spread to the lymph nodes    High Cholesterol Father     Diabetes Father     High Blood Pressure Father     Kidney Disease Father         on dialysis    Heart Failure Father     High Blood Pressure Sister    [employer] pain, palpitations and leg swelling. Gastrointestinal: Negative for abdominal distention and abdominal pain. Genitourinary: Negative for difficulty urinating. Musculoskeletal: Positive for back pain (chronic). Negative for arthralgias. Neurological: Negative for dizziness, syncope, light-headedness and headaches. Psychiatric/Behavioral: Negative for agitation, behavioral problems and sleep disturbance. Objective:     Physical Exam  Vitals signs and nursing note reviewed. Constitutional:       General: He is not in acute distress. Appearance: He is well-developed. He is obese. HENT:      Head: Normocephalic and atraumatic. Comments: Neck circumference 19.5\"     Right Ear: Tympanic membrane normal.      Left Ear: Tympanic membrane and external ear normal.      Nose: Nose normal.      Mouth/Throat:      Mouth: Mucous membranes are moist.      Pharynx: No oropharyngeal exudate. Eyes:      Pupils: Pupils are equal, round, and reactive to light. Neck:      Musculoskeletal: Normal range of motion and neck supple. Vascular: No carotid bruit. Cardiovascular:      Rate and Rhythm: Normal rate and regular rhythm. Pulses: Normal pulses. Heart sounds: Normal heart sounds. No murmur. Pulmonary:      Effort: Pulmonary effort is normal. No respiratory distress. Breath sounds: Normal breath sounds. Abdominal:      Palpations: Abdomen is soft. There is no mass. Tenderness: There is no abdominal tenderness. Musculoskeletal: Normal range of motion. General: Tenderness present. No swelling. Right lower leg: No edema. Left lower leg: No edema. Lymphadenopathy:      Cervical: No cervical adenopathy. Skin:     General: Skin is warm and dry. Findings: No rash. Neurological:      General: No focal deficit present. Mental Status: He is alert and oriented to person, place, and time.    Psychiatric:         Mood and Affect: Mood normal. Behavior: Behavior normal.         Thought Content: Thought content normal.         Judgment: Judgment normal.       BP (!) 172/100 (Site: Left Upper Arm, Cuff Size: Medium Adult)   Pulse 67   Wt 213 lb (96.6 kg)   SpO2 97%   BMI 32.39 kg/m²     Data:     Lab Results   Component Value Date     08/17/2019    K 3.7 08/17/2019    CL 99 08/17/2019    CO2 27 08/17/2019    BUN 14 08/17/2019    CREATININE 0.70 08/17/2019    GLUCOSE 97 08/17/2019    PROT 7.9 07/15/2019    LABALBU 4.8 07/15/2019    BILITOT 1.05 07/15/2019    ALKPHOS 66 07/15/2019    AST 58 07/15/2019    ALT 83 07/15/2019     Lab Results   Component Value Date    WBC 6.7 02/21/2019    HGB 16.4 02/21/2019    HCT 48.6 02/21/2019    MCV 90.6 02/21/2019    MCH 30.5 02/21/2019    MCHC 33.7 02/21/2019     02/21/2019     Lab Results   Component Value Date    TSH 0.45 08/17/2019     Lab Results   Component Value Date    CHOL 269 02/21/2019    HDL 48 02/21/2019    PSA 1.55 02/21/2019    LABA1C 5.3 02/21/2019          Assessment & Plan       1. Hypertensive urgency  Concern with continued htn with poor control. Strong family hx kidney issues and father with CAD and htn. - Echocardiogram complete; Future  - Stress test, myoview; Future  - Baseline Diagnostic Sleep Study; Future  - amLODIPine (NORVASC) 10 MG tablet; Take 1 tablet by mouth daily  Dispense: 30 tablet; Refill: 2  - Jennifer Grover MD, Cardiology, [facility]  - Catecholamines Free Urine; Future  - EKG 12 Lead; Future    2. Essential hypertension  Needs better control  Amlodipine add, likely will also need clonidine or hydralazine.     - Echocardiogram complete; Future  - Stress test, myoview; Future    3. Snoring  At risk for sleep apnea contributing to htn poor control  - Baseline Diagnostic Sleep Study; Future    4.  Colon cancer screening  Needs baseline  - Suzan King MD, General Surgery, [facility]                  Completed Refills   Requested Prescriptions

## 2020-02-11 RX ORDER — ROSUVASTATIN CALCIUM 20 MG/1
20 TABLET, COATED ORAL DAILY
Qty: 90 TABLET | Refills: 0 | Status: SHIPPED | OUTPATIENT
Start: 2020-02-11 | End: 2021-03-18

## 2020-06-12 ENCOUNTER — OFFICE VISIT (OUTPATIENT)
Dept: FAMILY MEDICINE CLINIC | Age: 55
End: 2020-06-12
Payer: COMMERCIAL

## 2020-06-12 VITALS
TEMPERATURE: 98 F | HEART RATE: 72 BPM | BODY MASS INDEX: 30.92 KG/M2 | DIASTOLIC BLOOD PRESSURE: 102 MMHG | SYSTOLIC BLOOD PRESSURE: 152 MMHG | WEIGHT: 204 LBS | HEIGHT: 68 IN

## 2020-06-12 PROCEDURE — 99386 PREV VISIT NEW AGE 40-64: CPT | Performed by: INTERNAL MEDICINE

## 2020-06-12 SDOH — ECONOMIC STABILITY: TRANSPORTATION INSECURITY
IN THE PAST 12 MONTHS, HAS LACK OF TRANSPORTATION KEPT YOU FROM MEETINGS, WORK, OR FROM GETTING THINGS NEEDED FOR DAILY LIVING?: NO

## 2020-06-12 SDOH — ECONOMIC STABILITY: FOOD INSECURITY: WITHIN THE PAST 12 MONTHS, THE FOOD YOU BOUGHT JUST DIDN'T LAST AND YOU DIDN'T HAVE MONEY TO GET MORE.: NEVER TRUE

## 2020-06-12 SDOH — ECONOMIC STABILITY: FOOD INSECURITY: WITHIN THE PAST 12 MONTHS, YOU WORRIED THAT YOUR FOOD WOULD RUN OUT BEFORE YOU GOT MONEY TO BUY MORE.: NEVER TRUE

## 2020-06-12 SDOH — ECONOMIC STABILITY: INCOME INSECURITY: HOW HARD IS IT FOR YOU TO PAY FOR THE VERY BASICS LIKE FOOD, HOUSING, MEDICAL CARE, AND HEATING?: NOT HARD AT ALL

## 2020-06-12 SDOH — ECONOMIC STABILITY: TRANSPORTATION INSECURITY
IN THE PAST 12 MONTHS, HAS THE LACK OF TRANSPORTATION KEPT YOU FROM MEDICAL APPOINTMENTS OR FROM GETTING MEDICATIONS?: NO

## 2020-06-12 ASSESSMENT — ENCOUNTER SYMPTOMS
ABDOMINAL PAIN: 0
SORE THROAT: 0
NAUSEA: 0
SHORTNESS OF BREATH: 0
CONSTIPATION: 0
BLOOD IN STOOL: 0
DIARRHEA: 0
RESPIRATORY NEGATIVE: 1

## 2020-06-12 NOTE — PROGRESS NOTES
Active member of club or organization: Not on file        Attends meetings of clubs or organizations: Not on file        Relationship status: Not on file      Intimate partner violence        Fear of current or ex partner: Not on file        Emotionally abused: Not on file        Physically abused: Not on file        Forced sexual activity: Not on file    Other Topics      Concerns:        Not on file    Social History Narrative      Not on file      Current Outpatient Medications on File Prior to Visit:  metoprolol tartrate (LOPRESSOR) 25 MG tablet, TAKE 1 TABLET TWICE A DAY  (STOP AMLODIPINE), Disp: 180 tablet, Rfl: 1  benazepril-hydrochlorthiazide (LOTENSIN HCT) 20-12.5 MG per tablet, Take 1 tab 2 x daily  FOR BLOOD PRESSURE, Disp: 60 tablet, Rfl: 0  methocarbamol (ROBAXIN) 750 MG tablet, Take 1 tablet by mouth daily At bedtime for muscle relaxant (back spasms), Disp: 90 tablet, Rfl: 1  rosuvastatin (CRESTOR) 20 MG tablet, Take 1 tablet by mouth daily (Patient not taking: Reported on 6/12/2020), Disp: 90 tablet, Rfl: 0  amLODIPine (NORVASC) 10 MG tablet, Take 1 tablet by mouth daily (Patient not taking: Reported on 6/12/2020), Disp: 30 tablet, Rfl: 2    No current facility-administered medications on file prior to visit.          Lab Results       Component                Value               Date                       NA                       139                 08/17/2019                 K                        3.7                 08/17/2019                 CL                       99                  08/17/2019                 CO2                      27                  08/17/2019                 BUN                      14                  08/17/2019                 CREATININE               0.70                08/17/2019                 GLUCOSE                  97                  08/17/2019                 CALCIUM                  10.6 (H)            08/17/2019                 PROT                     7.9 Genitourinary: Negative for difficulty urinating, dysuria, frequency and urgency. Musculoskeletal: Negative for arthralgias, joint swelling, myalgias, neck pain and neck stiffness. Skin: Negative. Neurological: Negative for syncope. Psychiatric/Behavioral: Negative. Objective:   Physical Exam  Vitals signs and nursing note reviewed. Constitutional:       Appearance: He is well-developed. HENT:      Head: Atraumatic. Eyes:      Conjunctiva/sclera: Conjunctivae normal.   Neck:      Musculoskeletal: Normal range of motion and neck supple. Cardiovascular:      Rate and Rhythm: Normal rate and regular rhythm. Heart sounds: Normal heart sounds. Pulmonary:      Effort: Pulmonary effort is normal.      Breath sounds: Normal breath sounds. Abdominal:      Palpations: Abdomen is soft. Tenderness: There is no abdominal tenderness. Lymphadenopathy:      Cervical: No cervical adenopathy. Skin:     Findings: No rash. Neurological:      Mental Status: He is alert. Psychiatric:         Behavior: Behavior normal.         Thought Content: Thought content normal.         Assessment:       Diagnosis Orders   1. Wellness examination  Comprehensive Metabolic Panel    Lipid Panel   2. Essential hypertension     3. Mixed hyperlipidemia     4. Prostate cancer screening  Psa screening           Plan:      1. Wellness examination  Recommend fasting labs a the [facility].  I discussed the current guidelines for a screening colonoscopy with Richard Landry. The purpose of screening colonoscopy to detect colon polyps or early colon cancer was discussed. Early detection can prevent death from colon cancer. Despite this information, Richard Landry refuses a screening colonoscopy at this time. - Comprehensive Metabolic Panel; Future  - Lipid Panel; Future    2. Essential hypertension  Continue on Lopressor and benazepril HCTZ. Add Amlodipine 5 mg daily.   Since Richard Landry had an elevated blood pressure in the

## 2020-06-19 ENCOUNTER — HOSPITAL ENCOUNTER (OUTPATIENT)
Age: 55
Discharge: HOME | End: 2020-06-19
Payer: COMMERCIAL

## 2020-06-19 LAB
ALBUMIN SERPL-MCNC: 4.8 G/DL (ref 3.5–5.2)
ALBUMIN/GLOBULIN RATIO: ABNORMAL (ref 1–2.5)
ALP BLD-CCNC: 67 U/L (ref 40–129)
ALT SERPL-CCNC: 171 U/L (ref 5–41)
ANION GAP SERPL CALCULATED.3IONS-SCNC: 13 MMOL/L (ref 9–17)
AST SERPL-CCNC: 138 U/L
BILIRUB SERPL-MCNC: 1.11 MG/DL (ref 0.3–1.2)
BUN BLDV-MCNC: 9 MG/DL (ref 6–20)
BUN/CREAT BLD: 12 (ref 9–20)
CALCIUM SERPL-MCNC: 10.4 MG/DL (ref 8.6–10.4)
CHLORIDE BLD-SCNC: 101 MMOL/L (ref 98–107)
CHOLESTEROL/HDL RATIO: 4.6
CHOLESTEROL: 217 MG/DL
CO2: 25 MMOL/L (ref 20–31)
CREAT SERPL-MCNC: 0.74 MG/DL (ref 0.7–1.2)
GFR AFRICAN AMERICAN: >60 ML/MIN
GFR NON-AFRICAN AMERICAN: >60 ML/MIN
GFR SERPL CREATININE-BSD FRML MDRD: ABNORMAL ML/MIN/{1.73_M2}
GFR SERPL CREATININE-BSD FRML MDRD: ABNORMAL ML/MIN/{1.73_M2}
GLUCOSE BLD-MCNC: 120 MG/DL (ref 70–99)
HDLC SERPL-MCNC: 47 MG/DL
LDL CHOLESTEROL: 122 MG/DL (ref 0–130)
PATIENT FASTING?: YES
POTASSIUM SERPL-SCNC: 3.7 MMOL/L (ref 3.7–5.3)
PROSTATE SPECIFIC ANTIGEN: 0.99 UG/L
SODIUM BLD-SCNC: 139 MMOL/L (ref 135–144)
TOTAL PROTEIN: 8.2 G/DL (ref 6.4–8.3)
TRIGL SERPL-MCNC: 240 MG/DL
VLDLC SERPL CALC-MCNC: ABNORMAL MG/DL (ref 1–30)

## 2020-06-19 PROCEDURE — 80053 COMPREHEN METABOLIC PANEL: CPT

## 2020-06-19 PROCEDURE — 36415 COLL VENOUS BLD VENIPUNCTURE: CPT

## 2020-06-19 PROCEDURE — 80061 LIPID PANEL: CPT

## 2020-06-19 PROCEDURE — G0103 PSA SCREENING: HCPCS

## 2020-06-26 ENCOUNTER — NURSE ONLY (OUTPATIENT)
Dept: FAMILY MEDICINE CLINIC | Age: 55
End: 2020-06-26

## 2020-06-26 VITALS — DIASTOLIC BLOOD PRESSURE: 68 MMHG | SYSTOLIC BLOOD PRESSURE: 104 MMHG | TEMPERATURE: 97 F | HEART RATE: 81 BPM

## 2020-06-26 RX ORDER — AMLODIPINE BESYLATE 10 MG/1
5 TABLET ORAL DAILY
Qty: 30 TABLET | Refills: 2 | Status: SHIPPED
Start: 2020-06-26 | End: 2020-07-09 | Stop reason: SDUPTHER

## 2020-07-09 RX ORDER — AMLODIPINE BESYLATE 5 MG/1
5 TABLET ORAL DAILY
Qty: 90 TABLET | Refills: 1 | Status: SHIPPED | OUTPATIENT
Start: 2020-07-09 | End: 2020-12-23 | Stop reason: SDUPTHER

## 2020-07-09 RX ORDER — BENAZEPRIL HYDROCHLORIDE AND HYDROCHLOROTHIAZIDE 20; 12.5 MG/1; MG/1
TABLET ORAL
Qty: 180 TABLET | Refills: 1 | Status: SHIPPED | OUTPATIENT
Start: 2020-07-09 | End: 2021-03-18 | Stop reason: SDUPTHER

## 2020-07-09 NOTE — TELEPHONE ENCOUNTER
Health Maintenance   Topic Date Due    Hepatitis C screen  1965    Pneumococcal 0-64 years Vaccine (1 of 1 - PPSV23) 12/29/1969    HIV screen  12/29/1978    Shingles Vaccine (1 of 2) 12/29/2013    Colon cancer screen colonoscopy  12/29/2013    Flu vaccine (1) 09/01/2020    Lipid screen  06/19/2021    Potassium monitoring  06/19/2021    Creatinine monitoring  06/19/2021    Diabetes screen  02/21/2022    DTaP/Tdap/Td vaccine (2 - Td) 11/22/2023    Hepatitis A vaccine  Aged Out    Hepatitis B vaccine  Aged Out    Hib vaccine  Aged Out    Meningococcal (ACWY) vaccine  Aged Out             (applicable per patient's age: Cancer Screenings, Depression Screening, Fall Risk Screening, Immunizations)    Hemoglobin A1C (%)   Date Value   02/21/2019 5.3     LDL Cholesterol (mg/dL)   Date Value   06/19/2020 122     LDL Calculated (mg/dL)   Date Value   02/21/2019 185 (A)     AST (U/L)   Date Value   06/19/2020 138 (H)     ALT (U/L)   Date Value   06/19/2020 171 (H)     BUN (mg/dL)   Date Value   06/19/2020 9      (goal A1C is < 7)   (goal LDL is <100) need 30-50% reduction from baseline     BP Readings from Last 3 Encounters:   06/26/20 104/68   06/12/20 (!) 152/102   02/10/20 (!) 172/100    (goal /80)      All Future Testing planned in [facility]:  Lab Frequency Next Occurrence   US THYROID Once 08/01/2020   Stress test, myoview Once 08/26/2020   Catecholamines Free Urine Once 08/03/2020   EKG 12 Lead Once 03/15/2020   US LIVER Once 07/03/2020       Next Visit Date:  Future Appointments   Date Time Provider Alethea Arias   7/10/2020 10:30  [address] [facility]   8/19/2020  3:30 PM Lina Morrow MD [facility]   9/18/2020  9:30 AM Colletta Heater, MD [facility]            Patient Active Problem List:     Hypertension     Hyperlipidemia

## 2020-07-10 ENCOUNTER — HOSPITAL ENCOUNTER (OUTPATIENT)
Dept: ULTRASOUND IMAGING | Age: 55
Discharge: HOME | End: 2020-07-12
Payer: COMMERCIAL

## 2020-07-10 PROCEDURE — 76705 ECHO EXAM OF ABDOMEN: CPT

## 2020-08-28 ENCOUNTER — HOSPITAL ENCOUNTER (OUTPATIENT)
Age: 55
Discharge: HOME | End: 2020-08-28
Payer: COMMERCIAL

## 2020-08-28 LAB
ALBUMIN SERPL-MCNC: 4.5 G/DL (ref 3.5–5.2)
ALBUMIN/GLOBULIN RATIO: ABNORMAL (ref 1–2.5)
ALP BLD-CCNC: 62 U/L (ref 40–129)
ALT SERPL-CCNC: 63 U/L (ref 5–41)
AST SERPL-CCNC: 51 U/L
BILIRUB SERPL-MCNC: 1.08 MG/DL (ref 0.3–1.2)
BILIRUBIN DIRECT: 0.22 MG/DL
BILIRUBIN, INDIRECT: 0.86 MG/DL (ref 0–1)
GLOBULIN: ABNORMAL G/DL (ref 1.5–3.8)
TOTAL PROTEIN: 7.6 G/DL (ref 6.4–8.3)

## 2020-08-28 PROCEDURE — 80076 HEPATIC FUNCTION PANEL: CPT

## 2020-08-28 PROCEDURE — 36415 COLL VENOUS BLD VENIPUNCTURE: CPT

## 2020-09-18 ENCOUNTER — OFFICE VISIT (OUTPATIENT)
Dept: FAMILY MEDICINE CLINIC | Age: 55
End: 2020-09-18
Payer: COMMERCIAL

## 2020-09-18 VITALS
BODY MASS INDEX: 29.25 KG/M2 | HEIGHT: 68 IN | DIASTOLIC BLOOD PRESSURE: 86 MMHG | WEIGHT: 193 LBS | SYSTOLIC BLOOD PRESSURE: 134 MMHG | TEMPERATURE: 98.2 F

## 2020-09-18 PROBLEM — K76.0 FATTY LIVER: Status: ACTIVE | Noted: 2020-09-18

## 2020-09-18 PROBLEM — G89.29 CHRONIC MIDLINE LOW BACK PAIN WITHOUT SCIATICA: Status: ACTIVE | Noted: 2020-09-18

## 2020-09-18 PROBLEM — M54.50 CHRONIC MIDLINE LOW BACK PAIN WITHOUT SCIATICA: Status: ACTIVE | Noted: 2020-09-18

## 2020-09-18 PROBLEM — R79.89 ELEVATED LFTS: Status: ACTIVE | Noted: 2020-09-18

## 2020-09-18 PROCEDURE — 99214 OFFICE O/P EST MOD 30 MIN: CPT | Performed by: INTERNAL MEDICINE

## 2020-09-18 ASSESSMENT — ENCOUNTER SYMPTOMS
BLOOD IN STOOL: 0
DIARRHEA: 0
SHORTNESS OF BREATH: 0
RESPIRATORY NEGATIVE: 1
CONSTIPATION: 0
SORE THROAT: 0
ABDOMINAL PAIN: 0
NAUSEA: 0

## 2020-09-18 NOTE — PATIENT INSTRUCTIONS
1. Prostate cancer screening  PSA with next labs. - Psa screening; Future    2. Essential hypertension  Controlled on the current medication.  - Comprehensive Metabolic Panel; Future    3. Mixed hyperlipidemia  Controlled on statin. Obtain labs approximately one week prior to the office appointment. Please fast for 12 hours prior to obtaining the labs. Water or black coffee (no cream or sugar) is allowed prior to the the labs. - Comprehensive Metabolic Panel; Future  - Lipid Panel; Future    4. Fatty liver  Continue to work on wt loss and decrease Alcohol. 5. Elevated LFTs  Continue to work on wt loss and decrease Alcohol.   - Comprehensive Metabolic Panel; Future    6. Chronic Lumbago. Take Ibuprofen as needed. Continue on Robaxin prn. Derik Lopez was instructed to follow up in the clinic in 6 months for check up or as needed with any medical issues.

## 2020-09-18 NOTE — PROGRESS NOTES
Subjective:      Patient ID: Ginna Britt is a 62 y.o. male. Landon Monique presents for a check up on his medical conditions HTN, Hyperlipidemia. Landon Monique denies new problems. Medications were reviewed with Landon Monique, he is  tolerating the medication. Bowels are regular. There has not been rectal bleeding. Landon Monique denies urinary complications, the urine stream is good. Landon Monique denies chest pain and denies increasing shortness of breath. Labs from  reviewed. Has been able to cut back on alcohol. Chronic low back pain. Ibuprofen PRN helps. US of the liver reviewed. Past Medical History:  2020: Elevated LFTs      Comment:  US with fatty liver. Valery Loera continues to drink alcohol.   : Hx of exercise stress test      Comment:  negative  No date: Hyperlipidemia      Comment:  LDL goal 100  No date: Hypertension    Past Surgical History:  No date: CYST REMOVAL      Comment:  Left wrist    Social History    Socioeconomic History      Marital status:       Spouse name: Not on file      Number of children: 1      Years of education: 12      Highest education level: High school graduate    Occupational History      Not on file    Social Needs      Financial resource strain: Not hard at all      Food insecurity        Worry: Never true        Inability: Never true      Transportation needs        Medical: No        Non-medical: No    Tobacco Use      Smoking status: Former Smoker        Packs/day: 0.50        Years: 25.00        Pack years: 12.5        Types: Cigarettes        Quit date:         Years since quittin.7      Smokeless tobacco: Never Used    Substance and Sexual Activity      Alcohol use: Yes        Comment: little      Drug use: No      Sexual activity: Not on file    Lifestyle      Physical activity        Days per week: Not on file        Minutes per session: Not on file      Stress: Not on file    Relationships      Social connections        Talks on phone: Not on file Gets together: Not on file        Attends [employer] service: Not on file        Active member of club or organization: Not on file        Attends meetings of clubs or organizations: Not on file        Relationship status: Not on file      Intimate partner violence        Fear of current or ex partner: Not on file        Emotionally abused: Not on file        Physically abused: Not on file        Forced sexual activity: Not on file    Other Topics      Concerns:        Not on file    Social History Narrative      Not on file      Review of patient's family history indicates:  Problem: Diabetes      Relation: Mother          Age of Onset: (Not Specified)          Comment: DM II  Problem: High Cholesterol      Relation: Mother          Age of Onset: (Not Specified)  Problem: High Blood Pressure      Relation: Mother          Age of Onset: (Not Specified)  Problem: Stroke      Relation: Mother          Age of Onset: 61  Problem: Heart Disease      Relation: Mother          Age of Onset: (Not Specified)          Comment: CAD with stents  Problem: Cancer      Relation: Mother          Age of Onset: 68          Comment: bladder CA with spread to the lymph nodes  Problem: High Cholesterol      Relation: Father          Age of Onset: (Not Specified)  Problem: Diabetes      Relation: Father          Age of Onset: (Not Specified)  Problem: High Blood Pressure      Relation: Father          Age of Onset: (Not Specified)  Problem: Kidney Disease      Relation: Father          Age of Onset: (Not Specified)          Comment: on dialysis  Problem: Heart Failure      Relation: Father          Age of Onset: (Not Specified)  Problem: High Blood Pressure      Relation: Sister          Age of Onset: (Not Specified)  Problem: No Known Problems      Relation: Brother          Age of Onset: (Not Specified)      Current Outpatient Medications on File Prior to Visit:  amLODIPine (NORVASC) 5 MG tablet, Take 1 tablet by mouth daily, Disp: 90 tablet, Rfl: 1  metoprolol tartrate (LOPRESSOR) 25 MG tablet, Take 1 tablet by mouth 2 times daily, Disp: 180 tablet, Rfl: 1  benazepril-hydrochlorthiazide (LOTENSIN HCT) 20-12.5 MG per tablet, Take 1 tab 2 x daily  FOR BLOOD PRESSURE, Disp: 180 tablet, Rfl: 1  rosuvastatin (CRESTOR) 20 MG tablet, Take 1 tablet by mouth daily (Patient not taking: Reported on 6/12/2020), Disp: 90 tablet, Rfl: 0  methocarbamol (ROBAXIN) 750 MG tablet, Take 1 tablet by mouth daily At bedtime for muscle relaxant (back spasms), Disp: 90 tablet, Rfl: 1    No current facility-administered medications on file prior to visit.        No Known Allergies      Lab Results       Component                Value               Date                       NA                       139                 06/19/2020                 K                        3.7                 06/19/2020                 CL                       101                 06/19/2020                 CO2                      25                  06/19/2020                 BUN                      9                   06/19/2020                 CREATININE               0.74                06/19/2020                 GLUCOSE                  120 (H)             06/19/2020                 CALCIUM                  10.4                06/19/2020                 PROT                     7.6                 08/28/2020                 LABALBU                  4.5                 08/28/2020                 BILITOT                  1.08                08/28/2020                 ALKPHOS                  62                  08/28/2020                 AST                      51 (H)              08/28/2020                 ALT                      63 (H)              08/28/2020                 LABGLOM                  >60                 06/19/2020                 GFRAA                    >60                 06/19/2020                 GLOB                     NOT REPORTED        08/28/2020 Lab Results       Component                Value               Date                       LABA1C                   5.3                 02/21/2019            No results found for: EAG    Lab Results       Component                Value               Date                       CHOL                     217 (H)             06/19/2020                 CHOL                     269 (H)             02/21/2019                 CHOL                     189                 12/29/2016            Lab Results       Component                Value               Date                       TRIG                     240 (H)             06/19/2020                 TRIG                     180 (H)             02/21/2019                 TRIG                     114                 12/29/2016            Lab Results       Component                Value               Date                       HDL                      47                  06/19/2020                 HDL                      48                  02/21/2019                 HDL                      64                  12/29/2016            Lab Results       Component                Value               Date                       LDLCHOLESTEROL           122                 06/19/2020                 LDLCALC                  185 (A)             02/21/2019                 LDLCALC                  102                 12/29/2016                 LDLCALC                  162 (A)             11/13/2014            Lab Results       Component                Value               Date                       VLDL                                         06/19/2020             NOT REPORTED (H)       VLDL                     23                  12/29/2016                 VLDL                     22                  11/13/2014            Lab Results       Component                Value               Date                       CHOLHDLRATIO             4.6                 06/19/2020 CHOLHDLRATIO             5.6 (H)             02/21/2019                 CHOLHDLRATIO             3.0                 12/29/2016                              Hypertension   This is a chronic problem. The current episode started more than 1 year ago. The problem is unchanged. The problem is controlled. Pertinent negatives include no chest pain, neck pain, palpitations or shortness of breath. Risk factors for coronary artery disease include dyslipidemia, male gender and family history. Past treatments include ACE inhibitors and diuretics. The current treatment provides significant improvement. There are no compliance problems. There is no history of angina. Hyperlipidemia   This is a chronic problem. The current episode started more than 1 year ago. The problem is controlled. Recent lipid tests were reviewed and are normal. Pertinent negatives include no chest pain, myalgias or shortness of breath. Current antihyperlipidemic treatment includes statins. The current treatment provides significant improvement of lipids. There are no compliance problems. Review of Systems   Constitutional: Negative. HENT: Negative for congestion, ear pain, postnasal drip, sneezing and sore throat. Eyes: Negative for visual disturbance. Respiratory: Negative. Negative for shortness of breath. Cardiovascular: Negative for chest pain, palpitations and leg swelling. Gastrointestinal: Negative for abdominal pain, blood in stool, constipation, diarrhea and nausea. Genitourinary: Negative for difficulty urinating, dysuria, frequency and urgency. Musculoskeletal: Negative for arthralgias, joint swelling, myalgias, neck pain and neck stiffness. Skin: Negative. Neurological: Negative for syncope. Psychiatric/Behavioral: Negative. Objective:   Physical Exam  Vitals signs and nursing note reviewed. Constitutional:       Appearance: He is well-developed. HENT:      Head: Atraumatic.    Eyes: colonoscopy. Odalys Nunn was instructed to follow up in the clinic in 6 months for check up or as needed with any medical issues.                     Efren Szymanski MD

## 2020-12-17 ENCOUNTER — OFFICE VISIT (OUTPATIENT)
Dept: FAMILY MEDICINE CLINIC | Age: 55
End: 2020-12-17
Payer: COMMERCIAL

## 2020-12-17 VITALS
TEMPERATURE: 97.8 F | DIASTOLIC BLOOD PRESSURE: 85 MMHG | HEIGHT: 68 IN | BODY MASS INDEX: 29.86 KG/M2 | WEIGHT: 197 LBS | SYSTOLIC BLOOD PRESSURE: 137 MMHG | HEART RATE: 78 BPM

## 2020-12-17 PROCEDURE — 99213 OFFICE O/P EST LOW 20 MIN: CPT | Performed by: INTERNAL MEDICINE

## 2020-12-17 RX ORDER — METHOCARBAMOL 750 MG/1
750 TABLET, FILM COATED ORAL DAILY
Qty: 90 TABLET | Refills: 1 | Status: SHIPPED | OUTPATIENT
Start: 2020-12-17 | End: 2021-03-18 | Stop reason: SDUPTHER

## 2020-12-17 RX ORDER — SODIUM, POTASSIUM,MAG SULFATES 17.5-3.13G
SOLUTION, RECONSTITUTED, ORAL ORAL
Qty: 1 BOTTLE | Refills: 0 | Status: ON HOLD | OUTPATIENT
Start: 2020-12-17 | End: 2021-01-11 | Stop reason: HOSPADM

## 2020-12-17 ASSESSMENT — ENCOUNTER SYMPTOMS
ABDOMINAL PAIN: 0
CONSTIPATION: 0
RESPIRATORY NEGATIVE: 1
DIARRHEA: 0
NAUSEA: 0
SORE THROAT: 0
BLOOD IN STOOL: 0

## 2020-12-17 NOTE — PROGRESS NOTES
Subjective:      Patient ID: Chaparrita Rodríguez is a 62 y.o. male. Doug Gamble a patient of mine presents to be evaluated for a colonoscopy. Doug Gamble is 62 y.o. and has not had a colonoscopy in the past.    Currently Doug Gamble denies rectal bleeding, denies bowel habit changes, and denies abdominal pain. There is not a family history of colon cancer. Past Medical History:  2020: Elevated LFTs      Comment:  US with fatty liver. Alexandrea Durant continues to drink alcohol.   2008: Hx of exercise stress test      Comment:  negative  No date: Hyperlipidemia      Comment:  LDL goal 100  No date: Hypertension    Past Surgical History:  No date: CYST REMOVAL      Comment:  Left wrist    Social History    Socioeconomic History      Marital status:       Spouse name: Not on file      Number of children: 1      Years of education: 12      Highest education level: High school graduate    Occupational History      Not on file    Social Needs      Financial resource strain: Not hard at all      Food insecurity        Worry: Never true        Inability: Never true      Transportation needs        Medical: No        Non-medical: No    Tobacco Use      Smoking status: Former Smoker        Packs/day: 0.50        Years: 25.00        Pack years: 12.5        Types: Cigarettes        Quit date: 2016        Years since quittin.9      Smokeless tobacco: Never Used    Substance and Sexual Activity      Alcohol use: Yes        Comment: little      Drug use: No      Sexual activity: Not on file    Lifestyle      Physical activity        Days per week: Not on file        Minutes per session: Not on file      Stress: Not on file    Relationships      Social connections        Talks on phone: Not on file        Gets together: Not on file        Attends [employer] service: Not on file        Active member of club or organization: Not on file        Attends meetings of clubs or organizations: Not on file        Relationship status: Not on BLOOD PRESSURE, Disp: 180 tablet, Rfl: 1    methocarbamol (ROBAXIN) 750 MG tablet, Take 1 tablet by mouth daily At bedtime for muscle relaxant (back spasms), Disp: 90 tablet, Rfl: 1    rosuvastatin (CRESTOR) 20 MG tablet, Take 1 tablet by mouth daily (Patient not taking: Reported on 6/12/2020), Disp: 90 tablet, Rfl: 0    No current facility-administered medications on file prior to visit.        No Known Allergies      Lab Results       Component                Value               Date                       NA                       139                 06/19/2020                 K                        3.7                 06/19/2020                 CL                       101                 06/19/2020                 CO2                      25                  06/19/2020                 BUN                      9                   06/19/2020                 CREATININE               0.74                06/19/2020                 GLUCOSE                  120 (H)             06/19/2020                 CALCIUM                  10.4                06/19/2020                 PROT                     7.6                 08/28/2020                 LABALBU                  4.5                 08/28/2020                 BILITOT                  1.08                08/28/2020                 ALKPHOS                  62                  08/28/2020                 AST                      51 (H)              08/28/2020                 ALT                      63 (H)              08/28/2020                 LABGLOM                  >60                 06/19/2020                 GFRAA                    >60                 06/19/2020                 GLOB                     NOT REPORTED        08/28/2020              Lab Results       Component                Value               Date                       LABA1C                   5.3                 02/21/2019            No results found for: EAG    Lab Results       Component Value               Date                       CHOL                     217 (H)             06/19/2020                 CHOL                     269 (H)             02/21/2019                 CHOL                     189                 12/29/2016            Lab Results       Component                Value               Date                       TRIG                     240 (H)             06/19/2020                 TRIG                     180 (H)             02/21/2019                 TRIG                     114                 12/29/2016            Lab Results       Component                Value               Date                       HDL                      47                  06/19/2020                 HDL                      48                  02/21/2019                 HDL                      64                  12/29/2016            Lab Results       Component                Value               Date                       LDLCHOLESTEROL           122                 06/19/2020                 LDLCALC                  185 (A)             02/21/2019                 LDLCALC                  102                 12/29/2016                 LDLCALC                  162 (A)             11/13/2014            Lab Results       Component                Value               Date                       VLDL                                         06/19/2020             NOT REPORTED (H)       VLDL                     23                  12/29/2016                 VLDL                     22                  11/13/2014            Lab Results       Component                Value               Date                       CHOLHDLRATIO             4.6                 06/19/2020                 CHOLHDLRATIO             5.6 (H)             02/21/2019                 CHOLHDLRATIO             3.0                 12/29/2016                                Review of Systems   Constitutional: Negative.     HENT: Negative for congestion, ear pain, postnasal drip, sneezing and sore throat. Eyes: Negative for visual disturbance. Respiratory: Negative. Cardiovascular: Negative for chest pain, palpitations and leg swelling. Gastrointestinal: Negative for abdominal pain, blood in stool, constipation, diarrhea and nausea. Genitourinary: Negative for difficulty urinating, dysuria, frequency and urgency. Musculoskeletal: Negative for arthralgias, joint swelling, myalgias, neck pain and neck stiffness. Skin: Negative. Neurological: Negative for syncope. Psychiatric/Behavioral: Negative. Objective:   Physical Exam  Vitals signs and nursing note reviewed. Constitutional:       Appearance: He is well-developed. HENT:      Head: Atraumatic. Eyes:      Conjunctiva/sclera: Conjunctivae normal.   Neck:      Musculoskeletal: Normal range of motion and neck supple. Cardiovascular:      Rate and Rhythm: Normal rate and regular rhythm. Heart sounds: Normal heart sounds. Pulmonary:      Effort: Pulmonary effort is normal.      Breath sounds: Normal breath sounds. Abdominal:      Palpations: Abdomen is soft. Tenderness: There is no abdominal tenderness. Lymphadenopathy:      Cervical: No cervical adenopathy. Skin:     Findings: No rash. Neurological:      Mental Status: He is alert. Psychiatric:         Behavior: Behavior normal.         Thought Content: Thought content normal.         Assessment:       Diagnosis Orders   1. Colon cancer screening  Na Sulfate-K Sulfate-Mg Sulf (SUPREP BOWEL PREP KIT) 17.5-3.13-1.6 GM/177ML SOLN   2. Pre-op testing  EKG 12 Lead   3. Back spasm  methocarbamol (ROBAXIN) 750 MG tablet           Plan:      1. Pre-op testing  ECG prior to the procedure. - EKG 12 Lead    2. Colon cancer screening  Set up for screening colonoscopy. Risk and benefits explained and informed consent obtained.   The bowel prep was explained to Keiko Ngo and he was instructed to start the prep the day before the procedure (printed directions were given). Avoid corn 1 week prior to the procedure as this can cause suctioning difficulties during the procedure. Avoid eating or drinking at least 8 hours prior to the procedure. Ruthann Pitts was encouraged to call the clinic if he has any questions prior to the procedure. - Na Sulfate-K Sulfate-Mg Sulf (SUPREP BOWEL PREP KIT) 17.5-3.13-1.6 GM/177ML SOLN; Use as directed. Dispense: 1 Bottle; Refill: 0    3. Back spasm    - methocarbamol (ROBAXIN) 750 MG tablet; Take 1 tablet by mouth daily At bedtime for muscle relaxant (back spasms)  Dispense: 90 tablet; Refill: 1    Follow up after the colonoscopy.           Ronny Riggs MD

## 2020-12-17 NOTE — PATIENT INSTRUCTIONS
Survey: You may be receiving a survey from [employer] regarding your visit today. You may get this in the mail, through your MyChart or in your email. Please complete the survey to enable us to provide the highest quality of care to you and your family. Please also, mention our names. If you cannot score us as very good (5 Stars) on any question, please feel free to call the office to discuss how we could have made your experience exceptional.      Thank You! MD Juan Goldsteinarnacion Shone, [address] [place] [address], [address] [place] [address]    Edis Alberto CMA      1. Pre-op testing  ECG prior to the procedure. - EKG 12 Lead    2. Colon cancer screening  Set up for screening colonoscopy. Risk and benefits explained and informed consent obtained. The bowel prep was explained to Irvin Castrejon and he was instructed to start the prep the day before the procedure (printed directions were given). Avoid corn 1 week prior to the procedure as this can cause suctioning difficulties during the procedure. Avoid eating or drinking at least 8 hours prior to the procedure. Irvin Castrejon was encouraged to call the clinic if he has any questions prior to the procedure. - Na Sulfate-K Sulfate-Mg Sulf (SUPREP BOWEL PREP KIT) 17.5-3.13-1.6 GM/177ML SOLN; Use as directed. Dispense: 1 Bottle; Refill: 0    3. Back spasm    - methocarbamol (ROBAXIN) 750 MG tablet; Take 1 tablet by mouth daily At bedtime for muscle relaxant (back spasms)  Dispense: 90 tablet; Refill: 1    Follow up after the colonoscopy.

## 2020-12-23 RX ORDER — AMLODIPINE BESYLATE 5 MG/1
5 TABLET ORAL DAILY
Qty: 90 TABLET | Refills: 1 | Status: SHIPPED | OUTPATIENT
Start: 2020-12-23 | End: 2021-03-18 | Stop reason: SDUPTHER

## 2020-12-23 NOTE — TELEPHONE ENCOUNTER
Health Maintenance   Topic Date Due    Hepatitis C screen  1965    HIV screen  12/29/1978    Shingles Vaccine (1 of 2) 12/29/2013    Colon cancer screen colonoscopy  12/29/2013    Lipid screen  06/19/2021    Potassium monitoring  06/19/2021    Creatinine monitoring  06/19/2021    Diabetes screen  02/21/2022    DTaP/Tdap/Td vaccine (2 - Td) 11/22/2023    Flu vaccine  Completed    Hepatitis A vaccine  Aged Out    Hepatitis B vaccine  Aged Out    Hib vaccine  Aged Out    Meningococcal (ACWY) vaccine  Aged Out    Pneumococcal 0-64 years Vaccine  Aged Out             (applicable per patient's age: Cancer Screenings, Depression Screening, Fall Risk Screening, Immunizations)    Hemoglobin A1C (%)   Date Value   02/21/2019 5.3     LDL Cholesterol (mg/dL)   Date Value   06/19/2020 122     LDL Calculated (mg/dL)   Date Value   02/21/2019 185 (A)     AST (U/L)   Date Value   08/28/2020 51 (H)     ALT (U/L)   Date Value   08/28/2020 63 (H)     BUN (mg/dL)   Date Value   06/19/2020 9      (goal A1C is < 7)   (goal LDL is <100) need 30-50% reduction from baseline     BP Readings from Last 3 Encounters:   12/17/20 137/85   09/18/20 134/86   06/26/20 104/68    (goal /80)      All Future Testing planned in [facility]:  Lab Frequency Next Occurrence   US THYROID Once 12/31/2020   Comprehensive Metabolic Panel Once 29/89/8452   Lipid Panel Once 03/18/2021   Psa screening Once 03/18/2021       Next Visit Date:  Future Appointments   Date Time Provider Alethea Arias   1/11/2021  5:45 AM SCHEDULE, [address] Jamal Ghost   3/18/2021  3:00 PM MD Nupur Mcmahon            Patient Active Problem List:     Hypertension     Hyperlipidemia     Chronic midline low back pain without sciatica     Elevated LFTs     Fatty liver

## 2021-01-07 DIAGNOSIS — Z12.11 ENCOUNTER FOR SCREENING COLONOSCOPY: Primary | ICD-10-CM

## 2021-01-09 ENCOUNTER — HOSPITAL ENCOUNTER (OUTPATIENT)
Age: 56
Discharge: HOME | End: 2021-01-09
Payer: COMMERCIAL

## 2021-01-09 PROCEDURE — 93005 ELECTROCARDIOGRAM TRACING: CPT | Performed by: INTERNAL MEDICINE

## 2021-01-10 ENCOUNTER — PREP FOR PROCEDURE (OUTPATIENT)
Dept: INTERNAL MEDICINE CLINIC | Age: 56
End: 2021-01-10

## 2021-01-10 LAB
EKG ATRIAL RATE: 66 BPM
EKG P AXIS: 35 DEGREES
EKG P-R INTERVAL: 150 MS
EKG Q-T INTERVAL: 390 MS
EKG QRS DURATION: 102 MS
EKG QTC CALCULATION (BAZETT): 408 MS
EKG R AXIS: 54 DEGREES
EKG T AXIS: 70 DEGREES
EKG VENTRICULAR RATE: 66 BPM

## 2021-01-10 PROCEDURE — 93010 ELECTROCARDIOGRAM REPORT: CPT | Performed by: INTERNAL MEDICINE

## 2021-01-10 RX ORDER — SODIUM CHLORIDE 0.9 % (FLUSH) 0.9 %
10 SYRINGE (ML) INJECTION EVERY 12 HOURS SCHEDULED
Status: CANCELLED | OUTPATIENT
Start: 2021-01-10

## 2021-01-10 RX ORDER — SODIUM CHLORIDE 0.9 % (FLUSH) 0.9 %
10 SYRINGE (ML) INJECTION PRN
Status: CANCELLED | OUTPATIENT
Start: 2021-01-10

## 2021-01-10 RX ORDER — SODIUM CHLORIDE, SODIUM LACTATE, POTASSIUM CHLORIDE, CALCIUM CHLORIDE 600; 310; 30; 20 MG/100ML; MG/100ML; MG/100ML; MG/100ML
INJECTION, SOLUTION INTRAVENOUS CONTINUOUS
Status: CANCELLED | OUTPATIENT
Start: 2021-01-10

## 2021-01-11 ENCOUNTER — ANESTHESIA (OUTPATIENT)
Dept: ENDOSCOPY | Age: 56
End: 2021-01-11
Payer: COMMERCIAL

## 2021-01-11 ENCOUNTER — HOSPITAL ENCOUNTER (OUTPATIENT)
Age: 56
Setting detail: OUTPATIENT SURGERY
Discharge: HOME | End: 2021-01-11
Attending: INTERNAL MEDICINE | Admitting: INTERNAL MEDICINE
Payer: COMMERCIAL

## 2021-01-11 ENCOUNTER — ANESTHESIA EVENT (OUTPATIENT)
Dept: ENDOSCOPY | Age: 56
End: 2021-01-11
Payer: COMMERCIAL

## 2021-01-11 ENCOUNTER — HOSPITAL ENCOUNTER (OUTPATIENT)
Dept: PREADMISSION TESTING | Age: 56
Setting detail: SPECIMEN
Discharge: HOME | End: 2021-01-11
Payer: COMMERCIAL

## 2021-01-11 VITALS
RESPIRATION RATE: 16 BRPM | OXYGEN SATURATION: 100 % | HEART RATE: 52 BPM | HEIGHT: 68 IN | WEIGHT: 202 LBS | DIASTOLIC BLOOD PRESSURE: 90 MMHG | TEMPERATURE: 96.9 F | BODY MASS INDEX: 30.62 KG/M2 | SYSTOLIC BLOOD PRESSURE: 145 MMHG

## 2021-01-11 VITALS
RESPIRATION RATE: 17 BRPM | SYSTOLIC BLOOD PRESSURE: 90 MMHG | DIASTOLIC BLOOD PRESSURE: 54 MMHG | OXYGEN SATURATION: 98 %

## 2021-01-11 LAB
SARS-COV-2, RAPID: NOT DETECTED
SARS-COV-2: NORMAL
SARS-COV-2: NORMAL
SOURCE: NORMAL

## 2021-01-11 PROCEDURE — 7100000011 HC PHASE II RECOVERY - ADDTL 15 MIN: Performed by: INTERNAL MEDICINE

## 2021-01-11 PROCEDURE — 7100000010 HC PHASE II RECOVERY - FIRST 15 MIN: Performed by: INTERNAL MEDICINE

## 2021-01-11 PROCEDURE — 88305 TISSUE EXAM BY PATHOLOGIST: CPT

## 2021-01-11 PROCEDURE — 2500000003 HC RX 250 WO HCPCS: Performed by: NURSE ANESTHETIST, CERTIFIED REGISTERED

## 2021-01-11 PROCEDURE — U0002 COVID-19 LAB TEST NON-CDC: HCPCS

## 2021-01-11 PROCEDURE — 3700000001 HC ADD 15 MINUTES (ANESTHESIA): Performed by: INTERNAL MEDICINE

## 2021-01-11 PROCEDURE — 6370000000 HC RX 637 (ALT 250 FOR IP): Performed by: INTERNAL MEDICINE

## 2021-01-11 PROCEDURE — 2709999900 HC NON-CHARGEABLE SUPPLY: Performed by: INTERNAL MEDICINE

## 2021-01-11 PROCEDURE — 3700000000 HC ANESTHESIA ATTENDED CARE: Performed by: INTERNAL MEDICINE

## 2021-01-11 PROCEDURE — 45380 COLONOSCOPY AND BIOPSY: CPT | Performed by: INTERNAL MEDICINE

## 2021-01-11 PROCEDURE — 6360000002 HC RX W HCPCS: Performed by: NURSE ANESTHETIST, CERTIFIED REGISTERED

## 2021-01-11 PROCEDURE — 3609010600 HC COLONOSCOPY POLYPECTOMY SNARE/COLD BIOPSY: Performed by: INTERNAL MEDICINE

## 2021-01-11 PROCEDURE — 2580000003 HC RX 258: Performed by: INTERNAL MEDICINE

## 2021-01-11 PROCEDURE — C9803 HOPD COVID-19 SPEC COLLECT: HCPCS

## 2021-01-11 RX ORDER — SODIUM CHLORIDE 0.9 % (FLUSH) 0.9 %
10 SYRINGE (ML) INJECTION EVERY 12 HOURS SCHEDULED
Status: DISCONTINUED | OUTPATIENT
Start: 2021-01-11 | End: 2021-01-11 | Stop reason: HOSPADM

## 2021-01-11 RX ORDER — PROPOFOL 10 MG/ML
INJECTION, EMULSION INTRAVENOUS CONTINUOUS PRN
Status: DISCONTINUED | OUTPATIENT
Start: 2021-01-11 | End: 2021-01-11 | Stop reason: SDUPTHER

## 2021-01-11 RX ORDER — MIDAZOLAM HYDROCHLORIDE 1 MG/ML
INJECTION INTRAMUSCULAR; INTRAVENOUS PRN
Status: DISCONTINUED | OUTPATIENT
Start: 2021-01-11 | End: 2021-01-11 | Stop reason: SDUPTHER

## 2021-01-11 RX ORDER — LIDOCAINE HYDROCHLORIDE 10 MG/ML
INJECTION, SOLUTION EPIDURAL; INFILTRATION; INTRACAUDAL; PERINEURAL PRN
Status: DISCONTINUED | OUTPATIENT
Start: 2021-01-11 | End: 2021-01-11 | Stop reason: SDUPTHER

## 2021-01-11 RX ORDER — FENTANYL CITRATE 50 UG/ML
INJECTION, SOLUTION INTRAMUSCULAR; INTRAVENOUS PRN
Status: DISCONTINUED | OUTPATIENT
Start: 2021-01-11 | End: 2021-01-11 | Stop reason: SDUPTHER

## 2021-01-11 RX ORDER — SODIUM CHLORIDE 0.9 % (FLUSH) 0.9 %
10 SYRINGE (ML) INJECTION PRN
Status: DISCONTINUED | OUTPATIENT
Start: 2021-01-11 | End: 2021-01-11 | Stop reason: HOSPADM

## 2021-01-11 RX ORDER — LIDOCAINE HYDROCHLORIDE 20 MG/ML
JELLY TOPICAL PRN
Status: DISCONTINUED | OUTPATIENT
Start: 2021-01-11 | End: 2021-01-11 | Stop reason: ALTCHOICE

## 2021-01-11 RX ORDER — SODIUM CHLORIDE, SODIUM LACTATE, POTASSIUM CHLORIDE, CALCIUM CHLORIDE 600; 310; 30; 20 MG/100ML; MG/100ML; MG/100ML; MG/100ML
INJECTION, SOLUTION INTRAVENOUS CONTINUOUS
Status: DISCONTINUED | OUTPATIENT
Start: 2021-01-11 | End: 2021-01-11 | Stop reason: HOSPADM

## 2021-01-11 RX ORDER — PROPOFOL 10 MG/ML
INJECTION, EMULSION INTRAVENOUS PRN
Status: DISCONTINUED | OUTPATIENT
Start: 2021-01-11 | End: 2021-01-11 | Stop reason: SDUPTHER

## 2021-01-11 RX ADMIN — PROPOFOL 40 MG: 10 INJECTION, EMULSION INTRAVENOUS at 07:27

## 2021-01-11 RX ADMIN — FENTANYL CITRATE 50 MCG: 50 INJECTION, SOLUTION INTRAMUSCULAR; INTRAVENOUS at 07:27

## 2021-01-11 RX ADMIN — SODIUM CHLORIDE, POTASSIUM CHLORIDE, SODIUM LACTATE AND CALCIUM CHLORIDE: 600; 310; 30; 20 INJECTION, SOLUTION INTRAVENOUS at 06:42

## 2021-01-11 RX ADMIN — PROPOFOL 100 MCG/KG/MIN: 10 INJECTION, EMULSION INTRAVENOUS at 07:27

## 2021-01-11 RX ADMIN — MIDAZOLAM 2 MG: 1 INJECTION INTRAMUSCULAR; INTRAVENOUS at 07:27

## 2021-01-11 RX ADMIN — PROPOFOL 40 MG: 10 INJECTION, EMULSION INTRAVENOUS at 07:39

## 2021-01-11 RX ADMIN — LIDOCAINE HYDROCHLORIDE 100 MG: 10 INJECTION, SOLUTION EPIDURAL; INFILTRATION; INTRACAUDAL; PERINEURAL at 07:27

## 2021-01-11 RX ADMIN — FENTANYL CITRATE 50 MCG: 50 INJECTION, SOLUTION INTRAMUSCULAR; INTRAVENOUS at 07:32

## 2021-01-11 ASSESSMENT — PAIN SCALES - GENERAL: PAINLEVEL_OUTOF10: 0

## 2021-01-11 NOTE — H&P
History and Physical        Patient ID: Kathy Jaeger is a 62 y.o. male.     Michell Pearson a patient of mine presents to be evaluated for a colonoscopy. Michell Pearson is 62 y.o. and has not had a colonoscopy in the past.    Currently Michell Pearson denies rectal bleeding, denies bowel habit changes, and denies abdominal pain. There is not a family history of colon cancer.     No current facility-administered medications on file prior to encounter. Current Outpatient Medications on File Prior to Encounter   Medication Sig Dispense Refill    metoprolol tartrate (LOPRESSOR) 25 MG tablet Take 1 tablet by mouth 2 times daily 180 tablet 1    benazepril-hydrochlorthiazide (LOTENSIN HCT) 20-12.5 MG per tablet Take 1 tab 2 x daily  FOR BLOOD PRESSURE 180 tablet 1    rosuvastatin (CRESTOR) 20 MG tablet Take 1 tablet by mouth daily (Patient not taking: Reported on 2020) 90 tablet 0       Past Medical History:  2020: Elevated LFTs      Comment:  US with fatty liver. Meg Walker continues to drink alcohol.   2008: Hx of exercise stress test      Comment:  negative  No date: Hyperlipidemia      Comment:  LDL goal 100  No date: Hypertension     Past Surgical History:  No date: CYST REMOVAL      Comment:  Left wrist     Social History    Socioeconomic History      Marital status:       Spouse name: Not on file      Number of children: 1      Years of education: 12      Highest education level: High school graduate    Occupational History      Not on file    Social Needs      Financial resource strain: Not hard at all      Food insecurity        Worry: Never true        Inability: Never true      Transportation needs        Medical: No        Non-medical: No    Tobacco Use      Smoking status: Former Smoker        Packs/day: 0.50        Years: 25.00        Pack years: 12.5        Types: Cigarettes        Quit date: 2016        Years since quittin.9      Smokeless tobacco: Never Used    Substance and Sexual Activity Alcohol use: Yes        Comment: little      Drug use: No      Sexual activity: Not on file    Lifestyle      Physical activity        Days per week: Not on file        Minutes per session: Not on file      Stress: Not on file    Relationships      Social connections        Talks on phone: Not on file        Gets together: Not on file        Attends Scientologist service: Not on file        Active member of club or organization: Not on file        Attends meetings of clubs or organizations: Not on file        Relationship status: Not on file      Intimate partner violence        Fear of current or ex partner: Not on file        Emotionally abused: Not on file        Physically abused: Not on file        Forced sexual activity: Not on file    Other Topics      Concerns:        Not on file    Social History Narrative      Not on file        Review of patient's family history indicates:  Problem: Diabetes      Relation: Mother          Age of Onset: (Not Specified)          Comment: DM II  Problem: High Cholesterol      Relation: Mother          Age of Onset: (Not Specified)  Problem: High Blood Pressure      Relation: Mother          Age of Onset: (Not Specified)  Problem: Stroke      Relation: Mother          Age of Onset: 61  Problem: Heart Disease      Relation: Mother          Age of Onset: (Not Specified)          Comment: CAD with stents  Problem: Cancer      Relation: Mother          Age of Onset: 68          Comment: bladder CA with spread to the lymph nodes  Problem: High Cholesterol      Relation: Father          Age of Onset: (Not Specified)  Problem: Diabetes      Relation: Father          Age of Onset: (Not Specified)  Problem: High Blood Pressure      Relation: Father          Age of Onset: (Not Specified)  Problem: Kidney Disease      Relation: Father          Age of Onset: (Not Specified)          Comment: on dialysis  Problem: Heart Failure      Relation: Father          Age of Onset: (Not Specified)  Problem: High Blood Pressure      Relation: Sister          Age of Onset: (Not Specified)  Problem: No Known Problems      Relation: Brother          Age of Onset: (Not Specified)        Current Outpatient Medications on File Prior to Visit:    amLODIPine (NORVASC) 5 MG tablet, Take 1 tablet by mouth daily, Disp: 90 tablet, Rfl: 1    metoprolol tartrate (LOPRESSOR) 25 MG tablet, Take 1 tablet by mouth 2 times daily, Disp: 180 tablet, Rfl: 1    benazepril-hydrochlorthiazide (LOTENSIN HCT) 20-12.5 MG per tablet, Take 1 tab 2 x daily  FOR BLOOD PRESSURE, Disp: 180 tablet, Rfl: 1    methocarbamol (ROBAXIN) 750 MG tablet, Take 1 tablet by mouth daily At bedtime for muscle relaxant (back spasms), Disp: 90 tablet, Rfl: 1    rosuvastatin (CRESTOR) 20 MG tablet, Take 1 tablet by mouth daily (Patient not taking: Reported on 6/12/2020), Disp: 90 tablet, Rfl: 0     No current facility-administered medications on file prior to visit.         No Known Allergies        Lab Results       Component                Value               Date                       NA                       139                 06/19/2020                 K                        3.7                 06/19/2020                 CL                       101                 06/19/2020                 CO2                      25                  06/19/2020                 BUN                      9                   06/19/2020                 CREATININE               0.74                06/19/2020                 GLUCOSE                  120 (H)             06/19/2020                 CALCIUM                  10.4                06/19/2020                 PROT                     7.6                 08/28/2020                 LABALBU                  4.5                 08/28/2020                 BILITOT                  1.08                08/28/2020                 ALKPHOS                  62                  08/28/2020                 AST 51 (H)              08/28/2020                 ALT                      63 (H)              08/28/2020                 LABGLOM                  >60                 06/19/2020                 GFRAA                    >60                 06/19/2020                 GLOB                     NOT REPORTED        08/28/2020               Lab Results       Component                Value               Date                       LABA1C                   5.3                 02/21/2019            No results found for: EAG     Lab Results       Component                Value               Date                       CHOL                     217 (H)             06/19/2020                 CHOL                     269 (H)             02/21/2019                 CHOL                     189                 12/29/2016            Lab Results       Component                Value               Date                       TRIG                     240 (H)             06/19/2020                 TRIG                     180 (H)             02/21/2019                 TRIG                     114                 12/29/2016            Lab Results       Component                Value               Date                       HDL                      47                  06/19/2020                 HDL                      48                  02/21/2019                 HDL                      64                  12/29/2016            Lab Results       Component                Value               Date                       LDLCHOLESTEROL           122                 06/19/2020                 LDLCALC                  185 (A)             02/21/2019                 LDLCALC                  102                 12/29/2016                 LDLCALC                  162 (A)             11/13/2014            Lab Results       Component                Value               Date                       VLDL 06/19/2020                        NOT REPORTED (H)       VLDL                     23                  12/29/2016                 VLDL                     22                  11/13/2014            Lab Results       Component                Value               Date                       LAURY             4.6                 06/19/2020                 LAURY             5.6 (H)             02/21/2019                 CHOLHUELRATIO             3.0                 12/29/2016                                          Review of Systems   Constitutional: Negative. HENT: Negative for congestion, ear pain, postnasal drip, sneezing and sore throat. Eyes: Negative for visual disturbance. Respiratory: Negative. Cardiovascular: Negative for chest pain, palpitations and leg swelling. Gastrointestinal: Negative for abdominal pain, blood in stool, constipation, diarrhea and nausea. Genitourinary: Negative for difficulty urinating, dysuria, frequency and urgency. Musculoskeletal: Negative for arthralgias, joint swelling, myalgias, neck pain and neck stiffness. Skin: Negative. Neurological: Negative for syncope. Psychiatric/Behavioral: Negative.          Objective:   Physical Exam  Vitals signs and nursing note reviewed. Constitutional:       Appearance: He is well-developed. HENT:      Head: Atraumatic. Eyes:      Conjunctiva/sclera: Conjunctivae normal.   Neck:      Musculoskeletal: Normal range of motion and neck supple. Cardiovascular:      Rate and Rhythm: Normal rate and regular rhythm. Heart sounds: Normal heart sounds. Pulmonary:      Effort: Pulmonary effort is normal.      Breath sounds: Normal breath sounds. Abdominal:      Palpations: Abdomen is soft. Tenderness: There is no abdominal tenderness. Lymphadenopathy:      Cervical: No cervical adenopathy. Skin:     Findings: No rash. Neurological:      Mental Status: He is alert.    Psychiatric:         Behavior: Behavior normal.         Thought Content: Thought content normal.            Assessment:     Diagnosis Orders   1. Colon cancer screening  Na Sulfate-K Sulfate-Mg Sulf (SUPREP BOWEL PREP KIT) 17.5-3.13-1.6 GM/177ML SOLN   2. Pre-op testing  EKG 12 Lead   3. Back spasm  methocarbamol (ROBAXIN) 750 MG tablet                       Plan:   1. Pre-op testing  ECG prior to the procedure. - EKG 12 Lead     2. Colon cancer screening  Set up for screening colonoscopy. Risk and benefits explained and informed consent obtained. The bowel prep was explained to Homar Arely and he was instructed to start the prep the day before the procedure (printed directions were given). Avoid corn 1 week prior to the procedure as this can cause suctioning difficulties during the procedure. Avoid eating or drinking at least 8 hours prior to the procedure. Homar Arely was encouraged to call the clinic if he has any questions prior to the procedure. - Na Sulfate-K Sulfate-Mg Sulf (SUPREP BOWEL PREP KIT) 17.5-3.13-1.6 GM/177ML SOLN; Use as directed. Dispense: 1 Bottle; Refill: 0     3. Back spasm     - methocarbamol (ROBAXIN) 750 MG tablet; Take 1 tablet by mouth daily At bedtime for muscle relaxant (back spasms)  Dispense: 90 tablet; Refill: 1     Follow up after the colonoscopy. 1/11/21:  H&P reviewed, no changes need to be made.

## 2021-01-11 NOTE — ANESTHESIA PRE PROCEDURE
Department of Anesthesiology  Preprocedure Note       Name:  Ca Sky   Age:  60 y.o.  :  1965                                          MRN:  [id number]         Date:  2021      Surgeon: Papito Marquez):  Carlin Freeman MD    Procedure: Procedure(s):  COLONOSCOPY    Medications prior to admission:   Prior to Admission medications    Medication Sig Start Date End Date Taking? Authorizing Provider   amLODIPine (NORVASC) 5 MG tablet Take 1 tablet by mouth daily 20  Yes Suhail Freeman MD   metoprolol tartrate (LOPRESSOR) 25 MG tablet Take 1 tablet by mouth 2 times daily 20  Yes Carlin Freeman MD   benazepril-hydrochlorthiazide (LOTENSIN HCT) 20-12.5 MG per tablet Take 1 tab 2 x daily  FOR BLOOD PRESSURE 20  Yes Suhail Freeman MD   Na Sulfate-K Sulfate-Mg Sulf (SUPREP BOWEL PREP KIT) 17.5-3.13-1.6 GM/177ML SOLN Use as directed.  20   Suhail Freeman MD   methocarbamol (ROBAXIN) 750 MG tablet Take 1 tablet by mouth daily At bedtime for muscle relaxant (back spasms) 20   Suhail Freeman MD   rosuvastatin (CRESTOR) 20 MG tablet Take 1 tablet by mouth daily  Patient not taking: Reported on 2020   SKYLER Santana - CNP       Current medications:    Current Facility-Administered Medications   Medication Dose Route Frequency Provider Last Rate Last Admin    lactated ringers infusion   Intravenous Continuous Suhail Freeman MD 75 mL/hr at 21 0642 New Bag at 21 4170    sodium chloride flush 0.9 % injection 10 mL  10 mL Intravenous 2 times per day Carlin Freeman MD        sodium chloride flush 0.9 % injection 10 mL  10 mL Intravenous PRN Suhail Freeman MD           Allergies:  No Known Allergies    Problem List:    Patient Active Problem List   Diagnosis Code    Hypertension I10    Hyperlipidemia E78.5    Chronic midline low back pain without sciatica M54.5, G89.29    Elevated LFTs R79.89    Fatty liver K76.0       Past Medical History:        Diagnosis Date    Elevated LFTs 2020 US with fatty liver. Alexander Bazan continues to drink alcohol.  Hx of exercise stress test 2008    negative    Hyperlipidemia     LDL goal 100    Hypertension        Past Surgical History:        Procedure Laterality Date    CYST REMOVAL      Left wrist       Social History:    Social History     Tobacco Use    Smoking status: Former Smoker     Packs/day: 0.50     Years: 25.00     Pack years: 12.50     Types: Cigarettes     Quit date:      Years since quittin.0    Smokeless tobacco: Never Used   Substance Use Topics    Alcohol use: Yes     Comment: about 3 beers a day roughly                                Counseling given: Not Answered      Vital Signs (Current):   Vitals:    21 0622 21 0628   BP:  (!) 183/93   Pulse:  67   Resp:  20   Temp:  36.9 °C (98.4 °F)   TempSrc:  Temporal   SpO2:  100%   Weight: 202 lb (91.6 kg)    Height: 5' 8\" (1.727 m)                                               BP Readings from Last 3 Encounters:   21 (!) 183/93   20 137/85   20 134/86       NPO Status: Time of last liquid consumption: 1800                        Time of last solid consumption: 1900                        Date of last liquid consumption: 01/10/21                        Date of last solid food consumption: 21    BMI:   Wt Readings from Last 3 Encounters:   21 202 lb (91.6 kg)   20 197 lb (89.4 kg)   20 193 lb (87.5 kg)     Body mass index is 30.71 kg/m².     CBC:   Lab Results   Component Value Date    WBC 6.7 2019    HGB 16.4 2019    HCT 48.6 2019    MCV 90.6 2019     2019       CMP:   Lab Results   Component Value Date     2020    K 3.7 2020     2020    CO2 25 2020    BUN 9 2020    CREATININE 0.74 2020    GFRAA >60 2020    LABGLOM >60 2020    GLUCOSE 120 2020    PROT 7.6 2020    CALCIUM 10.4 2020    BILITOT 1.08 2020    ALKPHOS 62 08/28/2020    AST 51 08/28/2020    ALT 63 08/28/2020       POC Tests: No results for input(s): POCGLU, POCNA, POCK, POCCL, POCBUN, POCHEMO, POCHCT in the last 72 hours. Coags: No results found for: PROTIME, INR, APTT    HCG (If Applicable): No results found for: PREGTESTUR, PREGSERUM, HCG, HCGQUANT     ABGs: No results found for: PHART, PO2ART, GYV5QWO, KDZ8COR, BEART, W2ZMPUZP     Type & Screen (If Applicable):  No results found for: LABABO, LABRH    Drug/Infectious Status (If Applicable):  No results found for: HIV, HEPCAB    COVID-19 Screening (If Applicable):   Lab Results   Component Value Date    COVID19 Not Detected 01/11/2021         Anesthesia Evaluation  Patient summary reviewed and Nursing notes reviewed  Airway: Mallampati: II  TM distance: >3 FB   Neck ROM: full  Mouth opening: > = 3 FB Dental:    (+) caps      Pulmonary:Negative Pulmonary ROS and normal exam  breath sounds clear to auscultation                             Cardiovascular:  Exercise tolerance: no interval change,   (+) hypertension:,       ECG reviewed  Rhythm: regular  Rate: normal                    Neuro/Psych:   Negative Neuro/Psych ROS              GI/Hepatic/Renal:   (+) liver disease (pt states that \"enzymesa were elevated but better now\"):, bowel prep,           Endo/Other: Negative Endo/Other ROS                    Abdominal:   (+) obese,     Abdomen: soft. Vascular: negative vascular ROS. Anesthesia Plan      MAC     ASA 2       Induction: intravenous. Anesthetic plan and risks discussed with patient. Plan discussed with attending.                   SKYLER Tapia - CRNA   1/11/2021

## 2021-01-11 NOTE — OP NOTE
[place] [address]                                OPERATIVE REPORT    PATIENT NAME: Laine Duque                  :        1965  MED REC NO:   [id number]                              ROOM:  ACCOUNT NO:   [de-identified]                           ADMIT DATE: 2021  PROVIDER:     Kristi Freeman    DATE OF PROCEDURE:  2021    VIDEO-ASSISTED COLONOSCOPY REPORT    ATTENDING PHYSICIAN:  Juli Winters MD    PRIMARY CARE PHYSICIAN:  Juli Winters MD    PROCEDURES:  1.  Video-assisted colonoscopy to the cecum. 2.  Transverse colon polypectomy with cold cup biopsy forceps. 3.  Sigmoid colon polypectomy with cold cup biopsy forceps. PREOPERATIVE DIAGNOSIS:  Colon cancer screening. POSTOPERATIVE DIAGNOSES:  1. Colon cancer screening. 2.  Sigmoid colon polyp. 3.  Transverse colon polyp. ANESTHESIA:  Per Krista Amado CRNA    ASSISTANTS:  _____. PHYSICIAN:  Suhail Freeman MD    ESTIMATED BLOOD LOSS:  Less than 5 mL. COMPLICATIONS:  None. PROCEDURE NOTE:  The patient was brought to the operating room, where  procedure was explained along with possible complications and informed  consent was obtained. He was then taken to the minor procedure room,  where continuous cardiopulmonary monitoring was placed along with O2 via  simple mask. The patient was then placed flat in bed, positioned on his  left side, made comfortable, and IV sedation was given per Krista Amado CRNA. Once the patient was adequately sedated, a digital rectal exam  was performed. Prostate was moderately enlarged. No nodules were felt. Colonoscope was inserted into the rectum with CO2 insufflation being  performed. The scope was slowly advanced up through the sigmoid colon through  the descending colon to the splenic flexure.   Upon first pass to the splenic flexure, no abnormalities were noted. The scope was then  advanced past the splenic flexure to the transverse colon. Upon first  past to the transverse colon, no abnormalities were noted. Scope was  advanced past the hepatic flexure down the ascending colon into the  cecum, where photodocumentation of the cecum x2 was taken. Placement of  the colonoscope into the cecum was also verified with transillumination  through the abdominal wall. Scope was then slowly withdrawn back up the  ascending colon back through the hepatic flexure and into the transverse  colon. In the transverse colon, just proximal to the splenic flexure,  an 8 mm sessile polyp was identified and photodocumentation was taken. With use of cold cup biopsy forceps, this polyp was removed with three  bites with good hemostasis obtained postpolypectomy. Photodocumentation  was taken postpolypectomy. The scope was withdrawn past the splenic  flexure down the descending colon back into the sigmoid colon, where at  approximately 22 cm extraction, a 5 mm sessile polyp was identified. Photodocumentation was taken. With use of cold cup biopsy forceps, this  polyp was removed with one bite with good hemostasis obtained  postpolypectomy. Photodocumentation was taken postpolypectomy. Scope  was then withdrawn back into the rectum, where the scope was retroflexed  upon itself. In the retroflexed position, no abnormalities were seen. Photodocumentation was taken. Colonoscope was then straightened,  suctioning was applied to remove excess air and the scope was withdrawn. The patient tolerated the procedure well without complications. He was  taken to the recovery room for further monitoring. He will be  instructed to follow up with Dr. Cedric Nye in 2 weeks to follow up with the  pathology of the polyps.         JONATHAN LAZCANO    D: 01/11/2021 8:15:32       T: 01/11/2021 8:57:04     [email or web address]  Job#: [id number]     Doc#: [id number]    CC:

## 2021-01-12 ENCOUNTER — TELEPHONE (OUTPATIENT)
Dept: PRIMARY CARE CLINIC | Age: 56
End: 2021-01-12

## 2021-01-12 LAB — SURGICAL PATHOLOGY REPORT: NORMAL

## 2021-03-18 ENCOUNTER — OFFICE VISIT (OUTPATIENT)
Dept: FAMILY MEDICINE CLINIC | Age: 56
End: 2021-03-18
Payer: COMMERCIAL

## 2021-03-18 VITALS
SYSTOLIC BLOOD PRESSURE: 136 MMHG | BODY MASS INDEX: 30.31 KG/M2 | HEART RATE: 72 BPM | DIASTOLIC BLOOD PRESSURE: 86 MMHG | HEIGHT: 68 IN | WEIGHT: 200 LBS

## 2021-03-18 DIAGNOSIS — I10 ESSENTIAL HYPERTENSION: Primary | ICD-10-CM

## 2021-03-18 DIAGNOSIS — M62.830 BACK SPASM: ICD-10-CM

## 2021-03-18 DIAGNOSIS — R73.9 HYPERGLYCEMIA: ICD-10-CM

## 2021-03-18 DIAGNOSIS — E78.2 MIXED HYPERLIPIDEMIA: ICD-10-CM

## 2021-03-18 DIAGNOSIS — K76.0 FATTY LIVER: ICD-10-CM

## 2021-03-18 PROCEDURE — G8427 DOCREV CUR MEDS BY ELIG CLIN: HCPCS | Performed by: INTERNAL MEDICINE

## 2021-03-18 PROCEDURE — G8417 CALC BMI ABV UP PARAM F/U: HCPCS | Performed by: INTERNAL MEDICINE

## 2021-03-18 PROCEDURE — 1036F TOBACCO NON-USER: CPT | Performed by: INTERNAL MEDICINE

## 2021-03-18 PROCEDURE — 3017F COLORECTAL CA SCREEN DOC REV: CPT | Performed by: INTERNAL MEDICINE

## 2021-03-18 PROCEDURE — 99214 OFFICE O/P EST MOD 30 MIN: CPT | Performed by: INTERNAL MEDICINE

## 2021-03-18 PROCEDURE — G8484 FLU IMMUNIZE NO ADMIN: HCPCS | Performed by: INTERNAL MEDICINE

## 2021-03-18 RX ORDER — BENAZEPRIL HYDROCHLORIDE AND HYDROCHLOROTHIAZIDE 20; 12.5 MG/1; MG/1
TABLET ORAL
Qty: 180 TABLET | Refills: 1 | Status: SHIPPED | OUTPATIENT
Start: 2021-03-18 | End: 2021-10-04 | Stop reason: SDUPTHER

## 2021-03-18 RX ORDER — AMLODIPINE BESYLATE 5 MG/1
5 TABLET ORAL DAILY
Qty: 90 TABLET | Refills: 1 | Status: SHIPPED | OUTPATIENT
Start: 2021-03-18 | End: 2021-10-04 | Stop reason: SDUPTHER

## 2021-03-18 RX ORDER — METHOCARBAMOL 750 MG/1
750 TABLET, FILM COATED ORAL DAILY
Qty: 30 TABLET | Refills: 2 | Status: SHIPPED | OUTPATIENT
Start: 2021-03-18 | End: 2022-06-23

## 2021-03-18 ASSESSMENT — ENCOUNTER SYMPTOMS
RESPIRATORY NEGATIVE: 1
BLOOD IN STOOL: 0
SORE THROAT: 0
ABDOMINAL PAIN: 0
NAUSEA: 0
CONSTIPATION: 0
SHORTNESS OF BREATH: 0
DIARRHEA: 0

## 2021-03-18 ASSESSMENT — PATIENT HEALTH QUESTIONNAIRE - PHQ9
SUM OF ALL RESPONSES TO PHQ9 QUESTIONS 1 & 2: 0
1. LITTLE INTEREST OR PLEASURE IN DOING THINGS: 0
2. FEELING DOWN, DEPRESSED OR HOPELESS: 0

## 2021-03-18 NOTE — PATIENT INSTRUCTIONS
1. Essential hypertension  Well controlled on the current medication. 2. Back spasm  Uses Robaxin PRN. 3. Mixed hyperlipidemia  Continue to watch a low cholesterol / low fat diet. 4. Hyperglycemia  HgbA1C with labs. 5. Fatty liver  Continue to work on wt loss / exercise. Watch a low fat diet. Take Vitamin E 400 units daily. Homar Mcgee was instructed to follow up in the clinic in 6 months for check up or as needed with any medical issues.

## 2021-03-18 NOTE — PROGRESS NOTES
Transportation needs        Medical: No        Non-medical: No    Tobacco Use      Smoking status: Former Smoker        Packs/day: 0.50        Years: 25.00        Pack years: 12.5        Types: Cigarettes        Quit date:         Years since quittin.2      Smokeless tobacco: Never Used    Substance and Sexual Activity      Alcohol use: Yes        Comment: about 3 beers a day roughly      Drug use: No      Sexual activity: Not on file    Lifestyle      Physical activity        Days per week: Not on file        Minutes per session: Not on file      Stress: Not on file    Relationships      Social connections        Talks on phone: Not on file        Gets together: Not on file        Attends Buddhist service: Not on file        Active member of club or organization: Not on file        Attends meetings of clubs or organizations: Not on file        Relationship status: Not on file      Intimate partner violence        Fear of current or ex partner: Not on file        Emotionally abused: Not on file        Physically abused: Not on file        Forced sexual activity: Not on file    Other Topics      Concerns:        Not on file    Social History Narrative      Not on file      Review of patient's family history indicates:  Problem: Diabetes      Relation: Mother          Age of Onset: (Not Specified)          Comment: DM II  Problem: High Cholesterol      Relation: Mother          Age of Onset: (Not Specified)  Problem: High Blood Pressure      Relation: Mother          Age of Onset: (Not Specified)  Problem: Stroke      Relation: Mother          Age of Onset: 61  Problem: Heart Disease      Relation: Mother          Age of Onset: (Not Specified)          Comment: CAD with stents  Problem: Cancer      Relation: Mother          Age of Onset: 68          Comment: bladder CA with spread to the lymph nodes  Problem: High Cholesterol      Relation: Father          Age of Onset: (Not Specified)  Problem: Diabetes Relation: Father          Age of Onset: (Not Specified)  Problem: High Blood Pressure      Relation: Father          Age of Onset: (Not Specified)  Problem: Kidney Disease      Relation: Father          Age of Onset: (Not Specified)          Comment: on dialysis  Problem: Heart Failure      Relation: Father          Age of Onset: (Not Specified)  Problem: High Blood Pressure      Relation: Sister          Age of Onset: (Not Specified)  Problem: No Known Problems      Relation: Brother          Age of Onset: (Not Specified)      Current Outpatient Medications on File Prior to Visit:  amLODIPine (NORVASC) 5 MG tablet, Take 1 tablet by mouth daily, Disp: 90 tablet, Rfl: 1  methocarbamol (ROBAXIN) 750 MG tablet, Take 1 tablet by mouth daily At bedtime for muscle relaxant (back spasms), Disp: 90 tablet, Rfl: 1  metoprolol tartrate (LOPRESSOR) 25 MG tablet, Take 1 tablet by mouth 2 times daily, Disp: 180 tablet, Rfl: 1  benazepril-hydrochlorthiazide (LOTENSIN HCT) 20-12.5 MG per tablet, Take 1 tab 2 x daily  FOR BLOOD PRESSURE, Disp: 180 tablet, Rfl: 1    No current facility-administered medications on file prior to visit.        No Known Allergies      Lab Results       Component                Value               Date                       NA                       139                 06/19/2020                 K                        3.7                 06/19/2020                 CL                       101                 06/19/2020                 CO2                      25                  06/19/2020                 BUN                      9                   06/19/2020                 CREATININE               0.74                06/19/2020                 GLUCOSE                  120 (H)             06/19/2020                 CALCIUM                  10.4                06/19/2020                 PROT                     7.6                 08/28/2020                 LABALBU                  4.5 162 (A)             11/13/2014            Lab Results       Component                Value               Date                       VLDL                                         06/19/2020             NOT REPORTED (H)       VLDL                     23                  12/29/2016                 VLDL                     22                  11/13/2014            Lab Results       Component                Value               Date                       CHOLHDLRATIO             4.6                 06/19/2020                 CHOLHDLRATIO             5.6 (H)             02/21/2019                 CHOLHDLRATIO             3.0                 12/29/2016                              Hypertension  This is a chronic problem. The current episode started more than 1 year ago. The problem is unchanged. The problem is controlled. Pertinent negatives include no chest pain, neck pain, palpitations or shortness of breath. Past treatments include calcium channel blockers, ACE inhibitors, diuretics and beta blockers. The current treatment provides significant improvement. There are no compliance problems. There is no history of angina. Review of Systems   Constitutional: Negative. HENT: Negative for congestion, ear pain, postnasal drip, sneezing and sore throat. Eyes: Negative for visual disturbance. Respiratory: Negative. Negative for shortness of breath. Cardiovascular: Negative for chest pain, palpitations and leg swelling. Gastrointestinal: Negative for abdominal pain, blood in stool, constipation, diarrhea and nausea. Genitourinary: Negative for difficulty urinating, dysuria, frequency and urgency. Musculoskeletal: Negative for arthralgias, joint swelling, myalgias, neck pain and neck stiffness. Skin: Negative. Neurological: Negative for syncope. Psychiatric/Behavioral: Negative. Physical Exam  Vitals signs and nursing note reviewed. Constitutional:       Appearance: He is well-developed. HENT:      Head: Atraumatic. Eyes:      Conjunctiva/sclera: Conjunctivae normal.   Neck:      Musculoskeletal: Normal range of motion and neck supple. Cardiovascular:      Rate and Rhythm: Normal rate and regular rhythm. Heart sounds: Normal heart sounds. Pulmonary:      Effort: Pulmonary effort is normal.      Breath sounds: Normal breath sounds. Abdominal:      Palpations: Abdomen is soft. Tenderness: There is no abdominal tenderness. Lymphadenopathy:      Cervical: No cervical adenopathy. Skin:     Findings: No rash. Neurological:      Mental Status: He is alert. Psychiatric:         Behavior: Behavior normal.         Thought Content: Thought content normal.                 An electronic signature was used to authenticate this note.     --Chris Nunn MD

## 2021-08-23 ENCOUNTER — OFFICE VISIT (OUTPATIENT)
Dept: FAMILY MEDICINE CLINIC | Age: 56
End: 2021-08-23
Payer: COMMERCIAL

## 2021-08-23 VITALS
DIASTOLIC BLOOD PRESSURE: 96 MMHG | WEIGHT: 188 LBS | HEART RATE: 66 BPM | HEIGHT: 68 IN | BODY MASS INDEX: 28.49 KG/M2 | SYSTOLIC BLOOD PRESSURE: 152 MMHG

## 2021-08-23 DIAGNOSIS — M79.602 LEFT ARM PAIN: ICD-10-CM

## 2021-08-23 DIAGNOSIS — R20.0 NUMBNESS AND TINGLING IN LEFT HAND: Primary | ICD-10-CM

## 2021-08-23 DIAGNOSIS — R07.89 OTHER CHEST PAIN: ICD-10-CM

## 2021-08-23 DIAGNOSIS — R20.2 NUMBNESS AND TINGLING IN LEFT HAND: Primary | ICD-10-CM

## 2021-08-23 PROCEDURE — G8427 DOCREV CUR MEDS BY ELIG CLIN: HCPCS | Performed by: INTERNAL MEDICINE

## 2021-08-23 PROCEDURE — 3017F COLORECTAL CA SCREEN DOC REV: CPT | Performed by: INTERNAL MEDICINE

## 2021-08-23 PROCEDURE — 99214 OFFICE O/P EST MOD 30 MIN: CPT | Performed by: INTERNAL MEDICINE

## 2021-08-23 PROCEDURE — 1036F TOBACCO NON-USER: CPT | Performed by: INTERNAL MEDICINE

## 2021-08-23 PROCEDURE — G8417 CALC BMI ABV UP PARAM F/U: HCPCS | Performed by: INTERNAL MEDICINE

## 2021-08-23 RX ORDER — PREDNISONE 20 MG/1
TABLET ORAL
Qty: 15 TABLET | Refills: 0 | Status: SHIPPED | OUTPATIENT
Start: 2021-08-23 | End: 2021-09-02

## 2021-08-23 SDOH — ECONOMIC STABILITY: FOOD INSECURITY: WITHIN THE PAST 12 MONTHS, THE FOOD YOU BOUGHT JUST DIDN'T LAST AND YOU DIDN'T HAVE MONEY TO GET MORE.: NEVER TRUE

## 2021-08-23 SDOH — ECONOMIC STABILITY: FOOD INSECURITY: WITHIN THE PAST 12 MONTHS, YOU WORRIED THAT YOUR FOOD WOULD RUN OUT BEFORE YOU GOT MONEY TO BUY MORE.: NEVER TRUE

## 2021-08-23 ASSESSMENT — ENCOUNTER SYMPTOMS
DIARRHEA: 0
NAUSEA: 0
SORE THROAT: 0
CONSTIPATION: 0
ABDOMINAL PAIN: 0
RESPIRATORY NEGATIVE: 1
BLOOD IN STOOL: 0

## 2021-08-23 ASSESSMENT — SOCIAL DETERMINANTS OF HEALTH (SDOH): HOW HARD IS IT FOR YOU TO PAY FOR THE VERY BASICS LIKE FOOD, HOUSING, MEDICAL CARE, AND HEATING?: NOT VERY HARD

## 2021-08-23 NOTE — PATIENT INSTRUCTIONS
Survey: You may be receiving a survey from [facility] regarding your visit today. You may get this in the mail, through your MyChart or in your email. Please complete the survey to enable us to provide the highest quality of care to you and your family. Please also, mention our names. If you cannot score us as very good (5 Stars) on any question, please feel free to call the office to discuss how we could have made your experience exceptional.      Thank You! MD Maurice Almonte, Caro Burton, MATHIEUN RN    Wen White, Wydanny      1. Other chest pain  Chest xray today. - XR CHEST (2 VW); Future  - predniSONE (DELTASONE) 20 MG tablet; 3 tablets daily x 3 days then 2 tablets daily x 2 days then 1 tablet daily x 2 days. Dispense: 15 tablet; Refill: 0    2. Numbness and tingling in left hand  Set up for an EMG study (likely cubital fossa syndrome)  - EMG; Future  - predniSONE (DELTASONE) 20 MG tablet; 3 tablets daily x 3 days then 2 tablets daily x 2 days then 1 tablet daily x 2 days. Dispense: 15 tablet; Refill: 0    3. Left arm pain  Not sure of the cause at this time but appears to be nerve. Will obtain CXR to rule out lesion around the brachial plexus. Take the prednisone taper as directed on the prescription. The prednisone is very bitter so swallow the tablets quickly to prevent  dissolving in the mouth. After taking, don't lay  down for 2 hours to help prevent reflux. Consider Ortho referral if pain continues. - predniSONE (DELTASONE) 20 MG tablet; 3 tablets daily x 3 days then 2 tablets daily x 2 days then 1 tablet daily x 2 days. Dispense: 15 tablet; Refill: 0    Landry Wallace is instructed to return to the clinic if the symptoms continue or worsen. Landry Wallace  was also instructed to go to the emergency room department if the symptoms significantly worsen before an appointment can be made.

## 2021-08-23 NOTE — PROGRESS NOTES
Gerardo Rascon (:  1965) is a 60 y.o. male,Established patient, here for evaluation of the following chief complaint(s):  Shoulder Pain (left shoulder pain, radiates down arm with numbness/tingling in fingers. Injured 2 years ago w/ fall. )         ASSESSMENT/PLAN:  1. Numbness and tingling in left hand  -     EMG; Future  -     predniSONE (DELTASONE) 20 MG tablet; 3 tablets daily x 3 days then 2 tablets daily x 2 days then 1 tablet daily x 2 days. , Disp-15 tablet, R-0Normal  2. Other chest pain  -     XR CHEST (2 VW); Future  -     predniSONE (DELTASONE) 20 MG tablet; 3 tablets daily x 3 days then 2 tablets daily x 2 days then 1 tablet daily x 2 days. , Disp-15 tablet, R-0Normal  3. Left arm pain  -     predniSONE (DELTASONE) 20 MG tablet; 3 tablets daily x 3 days then 2 tablets daily x 2 days then 1 tablet daily x 2 days. , Disp-15 tablet, R-0Normal      Plan   1. Other chest pain  Chest xray today. - XR CHEST (2 VW); Future  - predniSONE (DELTASONE) 20 MG tablet; 3 tablets daily x 3 days then 2 tablets daily x 2 days then 1 tablet daily x 2 days. Dispense: 15 tablet; Refill: 0    2. Numbness and tingling in left hand  Set up for an EMG study (likely cubital fossa syndrome)  - EMG; Future  - predniSONE (DELTASONE) 20 MG tablet; 3 tablets daily x 3 days then 2 tablets daily x 2 days then 1 tablet daily x 2 days. Dispense: 15 tablet; Refill: 0    3. Left arm pain  Not sure of the cause at this time but appears to be nerve. Will obtain CXR to rule out lesion around the brachial plexus. Take the prednisone taper as directed on the prescription. The prednisone is very bitter so swallow the tablets quickly to prevent  dissolving in the mouth. After taking, don't lay  down for 2 hours to help prevent reflux. Consider Ortho referral if pain continues. - predniSONE (DELTASONE) 20 MG tablet; 3 tablets daily x 3 days then 2 tablets daily x 2 days then 1 tablet daily x 2 days.   Dispense: 15 tablet; Refill: 0    Blanca Liu is instructed to return to the clinic if the symptoms continue or worsen. Blanca Liu  was also instructed to go to the emergency room department if the symptoms significantly worsen before an appointment can be made. Subjective   SUBJECTIVE/OBJECTIVE:  Slava Gray states a couple months ago he noticed some numbness and tingling in his left hand. Recently he has noticed pain in the arm pit on the left if he tries to lift his left arm. He has not tried any medication for the pain or numbness. Strength seems to be okay with squeezing. Numbness is worse toward the pinky side of his hand. No recent injury to the shoulder or arm. Movement or lifting makes the arm pit pain worsen. Review of Systems   Constitutional: Negative. HENT: Negative for congestion, ear pain, postnasal drip, sneezing and sore throat. Eyes: Negative for visual disturbance. Respiratory: Negative. Cardiovascular: Negative for chest pain, palpitations and leg swelling. Gastrointestinal: Negative for abdominal pain, blood in stool, constipation, diarrhea and nausea. Genitourinary: Negative for difficulty urinating, dysuria, frequency and urgency. Musculoskeletal: Positive for arthralgias. Negative for joint swelling, myalgias, neck pain and neck stiffness. Skin: Negative. Neurological: Positive for numbness. Negative for syncope. Psychiatric/Behavioral: Negative. Objective   Physical Exam  Vitals and nursing note reviewed. Constitutional:       Appearance: He is well-developed. HENT:      Head: Atraumatic. Eyes:      Conjunctiva/sclera: Conjunctivae normal.   Cardiovascular:      Rate and Rhythm: Normal rate and regular rhythm. Heart sounds: Normal heart sounds. Pulmonary:      Effort: Pulmonary effort is normal.      Breath sounds: Normal breath sounds. Abdominal:      Palpations: Abdomen is soft. Tenderness: There is no abdominal tenderness.    Musculoskeletal: General: Tenderness present. Cervical back: Normal range of motion and neck supple. Comments: Pain elicited over the left side of chest and upper arm with lifting his arm. Pain is not reproduced with palpation. Lymphadenopathy:      Cervical: No cervical adenopathy. Skin:     Findings: No rash. Neurological:      Mental Status: He is alert. Comments: Numbness over the 3rd, 4th, and 5th fingers on the left. Psychiatric:         Behavior: Behavior normal.         Thought Content: Thought content normal.                  An electronic signature was used to authenticate this note.     --Sin Henderson MD

## 2021-08-24 ENCOUNTER — HOSPITAL ENCOUNTER (OUTPATIENT)
Age: 56
Discharge: HOME | End: 2021-08-26
Payer: COMMERCIAL

## 2021-08-24 ENCOUNTER — HOSPITAL ENCOUNTER (OUTPATIENT)
Dept: GENERAL RADIOLOGY | Age: 56
Discharge: HOME | End: 2021-08-26
Payer: COMMERCIAL

## 2021-08-24 DIAGNOSIS — R07.89 OTHER CHEST PAIN: ICD-10-CM

## 2021-08-24 PROCEDURE — 71046 X-RAY EXAM CHEST 2 VIEWS: CPT

## 2021-09-01 ENCOUNTER — TELEPHONE (OUTPATIENT)
Dept: FAMILY MEDICINE CLINIC | Age: 56
End: 2021-09-01

## 2021-09-01 DIAGNOSIS — M79.2 RADICULAR PAIN IN LEFT ARM: Primary | ICD-10-CM

## 2021-09-01 RX ORDER — GABAPENTIN 300 MG/1
300 CAPSULE ORAL EVERY EVENING
Qty: 30 CAPSULE | Refills: 0 | Status: SHIPPED | OUTPATIENT
Start: 2021-09-01 | End: 2022-04-12

## 2021-09-01 NOTE — TELEPHONE ENCOUNTER
Gabapentin 300 mg ordered nightly. Can increase to two times a day after 3 days if he continues to have significant pain during the day but watch as it can cause drowsiness. Will likely need MRI after the EMG study. If pain is worsening, could refer to Dr. Fredis Franklin in pain mgt.

## 2021-09-01 NOTE — TELEPHONE ENCOUNTER
Patient called and complains of persistent pain, numbness and tingling. Patient finished prednisone taper and has been taking muscle relaxer and ibuprofen. Patient is unable to sleep well and feels he needs rx for pain. Pt is scheduled Tuesday for EMG.      Please advise, pt uses [facility]

## 2021-09-13 ENCOUNTER — NURSE ONLY (OUTPATIENT)
Dept: FAMILY MEDICINE CLINIC | Age: 56
End: 2021-09-13

## 2021-09-13 VITALS — DIASTOLIC BLOOD PRESSURE: 84 MMHG | SYSTOLIC BLOOD PRESSURE: 130 MMHG | HEART RATE: 61 BPM

## 2021-09-13 DIAGNOSIS — Z01.30 BP CHECK: Primary | ICD-10-CM

## 2021-09-13 NOTE — PROGRESS NOTES
Pt presents today for a blood pressure check. Denies HA, SOB, CP, or dizziness. BP's have been running- See Media  Have you had your Blood Pressure medications 2 hours Prior to this appointment? Yes  Any recent change in Blood Pressure medication? No    Pt brought his machine to check with ours.      Ours Read- 134/88  His Read- 130/84

## 2021-09-24 ENCOUNTER — HOSPITAL ENCOUNTER (OUTPATIENT)
Age: 56
Discharge: HOME | End: 2021-09-24
Payer: COMMERCIAL

## 2021-09-24 DIAGNOSIS — Z12.5 PROSTATE CANCER SCREENING: ICD-10-CM

## 2021-09-24 DIAGNOSIS — I10 ESSENTIAL HYPERTENSION: ICD-10-CM

## 2021-09-24 DIAGNOSIS — E78.2 MIXED HYPERLIPIDEMIA: ICD-10-CM

## 2021-09-24 DIAGNOSIS — R79.89 ELEVATED LFTS: ICD-10-CM

## 2021-09-24 LAB
ALBUMIN SERPL-MCNC: 4.2 G/DL (ref 3.5–5.2)
ALBUMIN/GLOBULIN RATIO: ABNORMAL (ref 1–2.5)
ALP BLD-CCNC: 67 U/L (ref 40–129)
ALT SERPL-CCNC: 26 U/L (ref 5–41)
ANION GAP SERPL CALCULATED.3IONS-SCNC: 9 MMOL/L (ref 9–17)
AST SERPL-CCNC: 22 U/L
BILIRUB SERPL-MCNC: 0.69 MG/DL (ref 0.3–1.2)
BUN BLDV-MCNC: 7 MG/DL (ref 6–20)
BUN/CREAT BLD: 10 (ref 9–20)
CALCIUM SERPL-MCNC: 9.6 MG/DL (ref 8.6–10.4)
CHLORIDE BLD-SCNC: 99 MMOL/L (ref 98–107)
CHOLESTEROL/HDL RATIO: 4.6
CHOLESTEROL: 237 MG/DL
CO2: 30 MMOL/L (ref 20–31)
CREAT SERPL-MCNC: 0.73 MG/DL (ref 0.7–1.2)
GFR AFRICAN AMERICAN: >60 ML/MIN
GFR NON-AFRICAN AMERICAN: >60 ML/MIN
GFR SERPL CREATININE-BSD FRML MDRD: ABNORMAL ML/MIN/{1.73_M2}
GFR SERPL CREATININE-BSD FRML MDRD: ABNORMAL ML/MIN/{1.73_M2}
GLUCOSE BLD-MCNC: 106 MG/DL (ref 70–99)
HDLC SERPL-MCNC: 51 MG/DL
LDL CHOLESTEROL: 156 MG/DL (ref 0–130)
POTASSIUM SERPL-SCNC: 3.9 MMOL/L (ref 3.7–5.3)
PROSTATE SPECIFIC ANTIGEN: 0.91 UG/L
SODIUM BLD-SCNC: 138 MMOL/L (ref 135–144)
TOTAL PROTEIN: 7.4 G/DL (ref 6.4–8.3)
TRIGL SERPL-MCNC: 152 MG/DL
VLDLC SERPL CALC-MCNC: ABNORMAL MG/DL (ref 1–30)

## 2021-09-24 PROCEDURE — G0103 PSA SCREENING: HCPCS

## 2021-09-24 PROCEDURE — 80053 COMPREHEN METABOLIC PANEL: CPT

## 2021-09-24 PROCEDURE — 36415 COLL VENOUS BLD VENIPUNCTURE: CPT

## 2021-09-24 PROCEDURE — 80061 LIPID PANEL: CPT

## 2021-10-04 ENCOUNTER — OFFICE VISIT (OUTPATIENT)
Dept: FAMILY MEDICINE CLINIC | Age: 56
End: 2021-10-04
Payer: COMMERCIAL

## 2021-10-04 VITALS
HEIGHT: 68 IN | SYSTOLIC BLOOD PRESSURE: 130 MMHG | DIASTOLIC BLOOD PRESSURE: 86 MMHG | WEIGHT: 191 LBS | HEART RATE: 70 BPM | BODY MASS INDEX: 28.95 KG/M2

## 2021-10-04 DIAGNOSIS — I10 ESSENTIAL HYPERTENSION: Primary | ICD-10-CM

## 2021-10-04 DIAGNOSIS — R73.9 HYPERGLYCEMIA: ICD-10-CM

## 2021-10-04 DIAGNOSIS — E78.2 MIXED HYPERCHOLESTEROLEMIA AND HYPERTRIGLYCERIDEMIA: ICD-10-CM

## 2021-10-04 PROCEDURE — 99214 OFFICE O/P EST MOD 30 MIN: CPT | Performed by: INTERNAL MEDICINE

## 2021-10-04 PROCEDURE — G8427 DOCREV CUR MEDS BY ELIG CLIN: HCPCS | Performed by: INTERNAL MEDICINE

## 2021-10-04 PROCEDURE — G8417 CALC BMI ABV UP PARAM F/U: HCPCS | Performed by: INTERNAL MEDICINE

## 2021-10-04 PROCEDURE — 1036F TOBACCO NON-USER: CPT | Performed by: INTERNAL MEDICINE

## 2021-10-04 PROCEDURE — G8484 FLU IMMUNIZE NO ADMIN: HCPCS | Performed by: INTERNAL MEDICINE

## 2021-10-04 PROCEDURE — 3017F COLORECTAL CA SCREEN DOC REV: CPT | Performed by: INTERNAL MEDICINE

## 2021-10-04 RX ORDER — BENAZEPRIL HYDROCHLORIDE AND HYDROCHLOROTHIAZIDE 20; 12.5 MG/1; MG/1
TABLET ORAL
Qty: 180 TABLET | Refills: 1 | Status: SHIPPED | OUTPATIENT
Start: 2021-10-04 | End: 2022-04-04

## 2021-10-04 RX ORDER — AMLODIPINE BESYLATE 5 MG/1
5 TABLET ORAL DAILY
Qty: 90 TABLET | Refills: 1 | Status: SHIPPED | OUTPATIENT
Start: 2021-10-04 | End: 2022-04-04

## 2021-10-04 RX ORDER — ROSUVASTATIN CALCIUM 10 MG/1
10 TABLET, COATED ORAL NIGHTLY
Qty: 90 TABLET | Refills: 1 | Status: SHIPPED | OUTPATIENT
Start: 2021-10-04 | End: 2022-04-04

## 2021-10-04 ASSESSMENT — ENCOUNTER SYMPTOMS
CONSTIPATION: 0
DIARRHEA: 0
NAUSEA: 0
BLOOD IN STOOL: 0
RESPIRATORY NEGATIVE: 1
SORE THROAT: 0
ABDOMINAL PAIN: 0
SHORTNESS OF BREATH: 0

## 2021-10-04 NOTE — PROGRESS NOTES
Pablo Cavazos (:  1965) is a 60 y.o. male,Established patient, here for evaluation of the following chief complaint(s):  Discuss Labs and Hypertension         ASSESSMENT/PLAN:  1. Essential hypertension  -     amLODIPine (NORVASC) 5 MG tablet; Take 1 tablet by mouth daily, Disp-90 tablet, R-1Normal  -     metoprolol tartrate (LOPRESSOR) 25 MG tablet; Take 1 tablet by mouth 2 times daily, Disp-180 tablet, R-1Normal  -     benazepril-hydrochlorthiazide (LOTENSIN HCT) 20-12.5 MG per tablet; Take 1 tab 2 x daily  FOR BLOOD PRESSURE, Disp-180 tablet, R-1Normal  2. Mixed hypercholesterolemia and hypertriglyceridemia  -     rosuvastatin (CRESTOR) 10 MG tablet; Take 1 tablet by mouth nightly, Disp-90 tablet, R-1Normal  -     Lipid Panel; Future  -     Hepatic Function Panel; Future  3. Hyperglycemia  -     Hemoglobin A1C; Future      Plan:  1. Essential hypertension  Controlled on the current medication. Rx's sent in.  - amLODIPine (NORVASC) 5 MG tablet; Take 1 tablet by mouth daily  Dispense: 90 tablet; Refill: 1  - metoprolol tartrate (LOPRESSOR) 25 MG tablet; Take 1 tablet by mouth 2 times daily  Dispense: 180 tablet; Refill: 1  - benazepril-hydrochlorthiazide (LOTENSIN HCT) 20-12.5 MG per tablet; Take 1 tab 2 x daily  FOR BLOOD PRESSURE  Dispense: 180 tablet; Refill: 1    2. Mixed hypercholesterolemia and hypertriglyceridemia  Add Crestor 10 mg daily. Fasting labs in 6 to 8 weeks. - Lipid Panel; Future  - Hepatic Function Panel; Future    3. Hyperglycemia  HgbA1C with labs. - Hemoglobin A1C; Future      Amarjit Peoples was instructed to follow up in the clinic in 6 months for check up or as needed with any medical issues. Subjective   SUBJECTIVE/OBJECTIVE:  Amarjit Peoples presents for a check up on his medical conditions HTN. Amarjitflorian Peoples denies new problems. Medications were reviewed with Amarjit Peoples, he is  tolerating the medication. Bowels are regular. There has not been rectal bleeding.   Amarjit Peoples denies urinary complications, the urine stream is good. Pernell Toure denies chest pain and denies increasing shortness of breath. Past Medical History:  2020: Elevated LFTs      Comment:  US with fatty liver. Delonte Mehta continues to drink alcohol.   2008: Hx of exercise stress test      Comment:  negative  No date: Hyperlipidemia      Comment:  LDL goal 100  No date: Hypertension    Past Surgical History:  2021: COLONOSCOPY; N/A      Comment:  Dr. Garland Hallmark:  1 adenomatous polyp  No date: CYST REMOVAL      Comment:  Left wrist    Social History    Socioeconomic History      Marital status:       Spouse name: Not on file      Number of children: 1      Years of education: 12      Highest education level: High school graduate    Occupational History      Not on file    Tobacco Use      Smoking status: Former Smoker        Packs/day: 0.50        Years: 25.00        Pack years: 12.5        Types: Cigarettes        Quit date:         Years since quittin.7      Smokeless tobacco: Never Used    Vaping Use      Vaping Use: Former    Substance and Sexual Activity      Alcohol use: Yes        Comment: about 3 beers a day roughly      Drug use: No      Sexual activity: Not on file    Other Topics      Concerns:        Not on file    Social History Narrative      Not on file    Social Determinants of Health  Financial Resource Strain: Low Risk       Difficulty of Paying Living Expenses: Not very hard  Food Insecurity: No Food Insecurity      Worried About [facility] in the Last Year: Never true      Ran Out of Food in the Last Year: Never true  Transportation Needs:       Lack of Transportation (Medical):       Lack of Transportation (Non-Medical):   Physical Activity:       Days of Exercise per Week:       Minutes of Exercise per Session:   Stress:       Feeling of Stress :   Social Connections:       Frequency of Communication with Friends and Family:       Frequency of Social Gatherings with Friends and Family:       Attends Yazdanism Services:       Active Member of Clubs or Organizations:       Attends Club or Organization Meetings:       Marital Status:   Intimate Partner Violence:       Fear of Current or Ex-Partner:       Emotionally Abused:       Physically Abused:       Sexually Abused:     Review of patient's family history indicates:  Problem: Diabetes      Relation: Mother          Age of Onset: (Not Specified)          Comment: DM II  Problem: High Cholesterol      Relation: Mother          Age of Onset: (Not Specified)  Problem: High Blood Pressure      Relation: Mother          Age of Onset: (Not Specified)  Problem: Stroke      Relation: Mother          Age of Onset: 61  Problem: Heart Disease      Relation: Mother          Age of Onset: (Not Specified)          Comment: CAD with stents  Problem: Cancer      Relation: Mother          Age of Onset: 68          Comment: bladder CA with spread to the lymph nodes  Problem: High Cholesterol      Relation: Father          Age of Onset: (Not Specified)  Problem: Diabetes      Relation: Father          Age of Onset: (Not Specified)  Problem: High Blood Pressure      Relation: Father          Age of Onset: (Not Specified)  Problem: Kidney Disease      Relation: Father          Age of Onset: (Not Specified)          Comment: on dialysis  Problem: Heart Failure      Relation: Father          Age of Onset: (Not Specified)  Problem: High Blood Pressure      Relation: Sister          Age of Onset: (Not Specified)  Problem: No Known Problems      Relation: Brother          Age of Onset: (Not Specified)      Current Outpatient Medications on File Prior to Visit:  amLODIPine (NORVASC) 5 MG tablet, Take 1 tablet by mouth daily, Disp: 90 tablet, Rfl: 1  metoprolol tartrate (LOPRESSOR) 25 MG tablet, Take 1 tablet by mouth 2 times daily, Disp: 180 tablet, Rfl: 1  benazepril-hydrochlorthiazide (LOTENSIN HCT) 20-12.5 MG per tablet, Take 1 tab 2 x daily  FOR BLOOD PRESSURE, Disp: 180 tablet, Rfl: 1  gabapentin (NEURONTIN) 300 MG capsule, Take 1 capsule by mouth every evening for 30 days. , Disp: 30 capsule, Rfl: 0  methocarbamol (ROBAXIN) 750 MG tablet, Take 1 tablet by mouth daily At bedtime for muscle relaxant (back spasms), Disp: 30 tablet, Rfl: 2    No current facility-administered medications on file prior to visit.       No Known Allergies      Lab Results       Component                Value               Date                       NA                       138                 09/24/2021                 K                        3.9                 09/24/2021                 CL                       99                  09/24/2021                 CO2                      30                  09/24/2021                 BUN                      7                   09/24/2021                 CREATININE               0.73                09/24/2021                 GLUCOSE                  106 (H)             09/24/2021                 CALCIUM                  9.6                 09/24/2021                 PROT                     7.4                 09/24/2021                 LABALBU                  4.2                 09/24/2021                 BILITOT                  0.69                09/24/2021                 ALKPHOS                  67                  09/24/2021                 AST                      22                  09/24/2021                 ALT                      26                  09/24/2021                 LABGLOM                  >60                 09/24/2021                 GFRAA                    >60                 09/24/2021                 GLOB                     NOT REPORTED        08/28/2020              Lab Results       Component                Value               Date                       LABA1C                   5.3                 02/21/2019            No results found for: EAG    Lab Results       Component                Value Date                       CHOL                     237 (H)             09/24/2021                 CHOL                     217 (H)             06/19/2020                 CHOL                     269 (H)             02/21/2019            Lab Results       Component                Value               Date                       TRIG                     152 (H)             09/24/2021                 TRIG                     240 (H)             06/19/2020                 TRIG                     180 (H)             02/21/2019            Lab Results       Component                Value               Date                       HDL                      51                  09/24/2021                 HDL                      47                  06/19/2020                 HDL                      48                  02/21/2019            Lab Results       Component                Value               Date                       LDLCHOLESTEROL           156 (H)             09/24/2021                 LDLCHOLESTEROL           122                 06/19/2020                 LDLCALC                  185 (A)             02/21/2019                 LDLCALC                  102                 12/29/2016                 LDLCALC                  162 (A)             11/13/2014            Lab Results       Component                Value               Date                       VLDL                     NOT REPORTED        09/24/2021                 VLDL                                         06/19/2020             NOT REPORTED (H)       VLDL                     23                  12/29/2016            Lab Results       Component                Value               Date                       CHOLHDLRATIO             4.6                 09/24/2021                 CHOLHDLRATIO             4.6                 06/19/2020                 CHOLHDLRATIO             5.6 (H)             02/21/2019                              Hypertension  This is a normal.                  An electronic signature was used to authenticate this note.     --Bert Sutton MD

## 2021-10-04 NOTE — PATIENT INSTRUCTIONS
Survey: You may be receiving a survey from [employer] regarding your visit today. You may get this in the mail, through your MyChart or in your email. Please complete the survey to enable us to provide the highest quality of care to you and your family. Please also, mention our names. If you cannot score us as very good (5 Stars) on any question, please feel free to call the office to discuss how we could have made your experience exceptional.      Thank You! MD Denia Motley, Gerber Burton, MATHIEUN MELVIN    [facility], [address] [place] [address]      1. Essential hypertension  Controlled on the current medication. Rx's sent in.  - amLODIPine (NORVASC) 5 MG tablet; Take 1 tablet by mouth daily  Dispense: 90 tablet; Refill: 1  - metoprolol tartrate (LOPRESSOR) 25 MG tablet; Take 1 tablet by mouth 2 times daily  Dispense: 180 tablet; Refill: 1  - benazepril-hydrochlorthiazide (LOTENSIN HCT) 20-12.5 MG per tablet; Take 1 tab 2 x daily  FOR BLOOD PRESSURE  Dispense: 180 tablet; Refill: 1    2. Mixed hypercholesterolemia and hypertriglyceridemia  Add Crestor 10 mg daily. Fasting labs in 6 to 8 weeks. - Lipid Panel; Future  - Hepatic Function Panel; Future    3. Hyperglycemia  HgbA1C with labs. - Hemoglobin A1C; Future      Mara Yogi was instructed to follow up in the clinic in 6 months for check up or as needed with any medical issues.

## 2022-04-04 DIAGNOSIS — E78.2 MIXED HYPERCHOLESTEROLEMIA AND HYPERTRIGLYCERIDEMIA: ICD-10-CM

## 2022-04-04 DIAGNOSIS — I10 ESSENTIAL HYPERTENSION: ICD-10-CM

## 2022-04-04 RX ORDER — AMLODIPINE BESYLATE 5 MG/1
TABLET ORAL
Qty: 90 TABLET | Refills: 0 | Status: SHIPPED | OUTPATIENT
Start: 2022-04-04 | End: 2022-06-23

## 2022-04-04 RX ORDER — ROSUVASTATIN CALCIUM 10 MG/1
TABLET, COATED ORAL
Qty: 90 TABLET | Refills: 0 | Status: SHIPPED | OUTPATIENT
Start: 2022-04-04 | End: 2022-06-23

## 2022-04-04 RX ORDER — BENAZEPRIL HYDROCHLORIDE AND HYDROCHLOROTHIAZIDE 20; 12.5 MG/1; MG/1
TABLET ORAL
Qty: 180 TABLET | Refills: 0 | Status: SHIPPED | OUTPATIENT
Start: 2022-04-04 | End: 2022-06-23

## 2022-04-04 NOTE — TELEPHONE ENCOUNTER
Health Maintenance   Topic Date Due    Hepatitis C screen  Never done    HIV screen  Never done    Shingles Vaccine (1 of 2) Never done    COVID-19 Vaccine (3 - Booster for Pfizer series) 09/14/2021    Diabetes screen  02/21/2022    Depression Screen  03/18/2022    Flu vaccine (Season Ended) 09/01/2022    Lipid screen  09/24/2022    Potassium monitoring  09/24/2022    Creatinine monitoring  09/24/2022    DTaP/Tdap/Td vaccine (2 - Td or Tdap) 11/22/2023    Colorectal Cancer Screen  01/11/2026    Hepatitis A vaccine  Aged Out    Hepatitis B vaccine  Aged Out    Hib vaccine  Aged Out    Meningococcal (ACWY) vaccine  Aged Out    Pneumococcal 0-64 years Vaccine  Aged Out             (applicable per patient's age: Cancer Screenings, Depression Screening, Fall Risk Screening, Immunizations)    Hemoglobin A1C (%)   Date Value   02/21/2019 5.3     LDL Cholesterol (mg/dL)   Date Value   09/24/2021 156 (H)     LDL Calculated (mg/dL)   Date Value   02/21/2019 185 (A)     AST (U/L)   Date Value   09/24/2021 22     ALT (U/L)   Date Value   09/24/2021 26     BUN (mg/dL)   Date Value   09/24/2021 7      (goal A1C is < 7)   (goal LDL is <100) need 30-50% reduction from baseline     BP Readings from Last 3 Encounters:   10/04/21 130/86   09/13/21 130/84   08/23/21 (!) 152/96    (goal /80)      All Future Testing planned in [facility]:  Lab Frequency Next Occurrence   Lipid Panel Once 04/13/2022   Hepatic Function Panel Once 04/13/2022   Hemoglobin A1C Once 04/13/2022       Next Visit Date:  Future Appointments   Date Time Provider Alethea Arias   4/12/2022  3:15 PM MD Yazmin Norton            Patient Active Problem List:     Hypertension     Hyperlipidemia     Chronic midline low back pain without sciatica     Elevated LFTs     Fatty liver

## 2022-04-12 ENCOUNTER — HOSPITAL ENCOUNTER (OUTPATIENT)
Age: 57
Discharge: HOME | End: 2022-04-12
Payer: COMMERCIAL

## 2022-04-12 ENCOUNTER — OFFICE VISIT (OUTPATIENT)
Dept: FAMILY MEDICINE CLINIC | Age: 57
End: 2022-04-12
Payer: COMMERCIAL

## 2022-04-12 VITALS
SYSTOLIC BLOOD PRESSURE: 134 MMHG | BODY MASS INDEX: 29.4 KG/M2 | HEIGHT: 68 IN | DIASTOLIC BLOOD PRESSURE: 86 MMHG | HEART RATE: 70 BPM | WEIGHT: 194 LBS

## 2022-04-12 DIAGNOSIS — E78.2 MIXED HYPERLIPIDEMIA: ICD-10-CM

## 2022-04-12 DIAGNOSIS — R79.89 ELEVATED LFTS: ICD-10-CM

## 2022-04-12 DIAGNOSIS — K76.0 FATTY LIVER: ICD-10-CM

## 2022-04-12 DIAGNOSIS — Z12.5 PROSTATE CANCER SCREENING: ICD-10-CM

## 2022-04-12 DIAGNOSIS — I10 PRIMARY HYPERTENSION: Primary | ICD-10-CM

## 2022-04-12 LAB
ALBUMIN SERPL-MCNC: 4.5 G/DL (ref 3.5–5.2)
ALP BLD-CCNC: 57 U/L (ref 40–129)
ALT SERPL-CCNC: 46 U/L (ref 5–41)
AST SERPL-CCNC: 41 U/L
BILIRUB SERPL-MCNC: 1.46 MG/DL (ref 0.3–1.2)
BILIRUBIN DIRECT: 0.31 MG/DL
BILIRUBIN, INDIRECT: 1.15 MG/DL (ref 0–1)
CHOLESTEROL/HDL RATIO: 3.3
CHOLESTEROL: 148 MG/DL
ESTIMATED AVERAGE GLUCOSE: 108 MG/DL
HBA1C MFR BLD: 5.4 % (ref 4–6)
HDLC SERPL-MCNC: 45 MG/DL
LDL CHOLESTEROL: 35 MG/DL (ref 0–130)
TOTAL PROTEIN: 7.1 G/DL (ref 6.4–8.3)
TRIGL SERPL-MCNC: 342 MG/DL

## 2022-04-12 PROCEDURE — G8427 DOCREV CUR MEDS BY ELIG CLIN: HCPCS | Performed by: INTERNAL MEDICINE

## 2022-04-12 PROCEDURE — 99214 OFFICE O/P EST MOD 30 MIN: CPT | Performed by: INTERNAL MEDICINE

## 2022-04-12 PROCEDURE — 3017F COLORECTAL CA SCREEN DOC REV: CPT | Performed by: INTERNAL MEDICINE

## 2022-04-12 PROCEDURE — 80061 LIPID PANEL: CPT

## 2022-04-12 PROCEDURE — 36415 COLL VENOUS BLD VENIPUNCTURE: CPT

## 2022-04-12 PROCEDURE — 83036 HEMOGLOBIN GLYCOSYLATED A1C: CPT

## 2022-04-12 PROCEDURE — 1036F TOBACCO NON-USER: CPT | Performed by: INTERNAL MEDICINE

## 2022-04-12 PROCEDURE — G8417 CALC BMI ABV UP PARAM F/U: HCPCS | Performed by: INTERNAL MEDICINE

## 2022-04-12 PROCEDURE — 80076 HEPATIC FUNCTION PANEL: CPT

## 2022-04-12 ASSESSMENT — ENCOUNTER SYMPTOMS
DIARRHEA: 0
CONSTIPATION: 0
NAUSEA: 0
BLOOD IN STOOL: 0
SHORTNESS OF BREATH: 0
RESPIRATORY NEGATIVE: 1
ABDOMINAL PAIN: 0
SORE THROAT: 0

## 2022-04-12 ASSESSMENT — PATIENT HEALTH QUESTIONNAIRE - PHQ9
SUM OF ALL RESPONSES TO PHQ9 QUESTIONS 1 & 2: 0
SUM OF ALL RESPONSES TO PHQ QUESTIONS 1-9: 0
SUM OF ALL RESPONSES TO PHQ QUESTIONS 1-9: 0
1. LITTLE INTEREST OR PLEASURE IN DOING THINGS: 0
2. FEELING DOWN, DEPRESSED OR HOPELESS: 0
SUM OF ALL RESPONSES TO PHQ QUESTIONS 1-9: 0
SUM OF ALL RESPONSES TO PHQ QUESTIONS 1-9: 0

## 2022-04-12 NOTE — PROGRESS NOTES
Issac Garcia (:  1965) is a 60 y.o. male,Established patient, here for evaluation of the following chief complaint(s):  Discuss Labs, Hyperlipidemia, and Hypertension         ASSESSMENT/PLAN:  1. Primary hypertension  -     Comprehensive Metabolic Panel; Future  2. Mixed hyperlipidemia  -     Comprehensive Metabolic Panel; Future  -     Lipid Panel; Future  3. Elevated LFTs  -     Comprehensive Metabolic Panel; Future  4. Fatty liver  5. Prostate cancer screening  -     PSA Screening; Future    Plan:  1. Primary hypertension  Controlled on Lotensin HCT, Lopressor, and Amlodipine. No change in medication. 2. Mixed hyperlipidemia  Controlled on Crestor. No change in medication. 3. Elevated LFTs  Discussed cutting back on Alcohol as it appears his LFT's are more associated with alcohol rather than fatty liver. 4. Fatty liver  Continue to work on wt loss and low fat diet. Nuria Borjas was instructed to follow up in the clinic in 6 months for check up or as needed with any medical issues. Subjective   SUBJECTIVE/OBJECTIVE:  Nuria Borjas presents for a check up on his medical conditions HTN, Hyperlipidemia. Nuria Borjas denies new problems. Medications were reviewed with Nuria Borjas, he is  tolerating the medication. Bowels are regular. There has not been rectal bleeding. Nuria Borjas denies urinary complications, the urine stream is good. Nuria Borjas denies chest pain and denies increasing shortness of breath. Labs from today reviewed. Past Medical History:  2020: Elevated LFTs      Comment:  US with fatty liver. Valery Allan continues to drink alcohol.   2008: Hx of exercise stress test      Comment:  negative  No date: Hyperlipidemia      Comment:  LDL goal 100  No date: Hypertension    Past Surgical History:  2021: COLONOSCOPY; N/A      Comment:  Dr. Coker Genre:  1 adenomatous polyp  No date: CYST REMOVAL      Comment:  Left wrist    Social History    Socioeconomic History      Marital status:       Spouse name: Not on file      Number of children: 1      Years of education: 12      Highest education level: High school graduate    Occupational History      Not on file    Tobacco Use      Smoking status: Former Smoker        Packs/day: 0.50        Years: 25.00        Pack years: 12.5        Types: Cigarettes        Quit date: 2016        Years since quittin.2      Smokeless tobacco: Never Used    Vaping Use      Vaping Use: Former    Substance and Sexual Activity      Alcohol use: Yes        Comment: about 3 beers a day roughly      Drug use: No      Sexual activity: Not on file    Other Topics      Concerns:        Not on file    Social History Narrative      Not on file    Social Determinants of Health  Financial Resource Strain: Low Risk       Difficulty of Paying Living Expenses: Not very hard  Food Insecurity: No Food Insecurity      Worried About [address] [place] [address] in the Last Year: Never true      Ran Out of Food in the Last Year: Never true  Transportation Needs:       Lack of Transportation (Medical): Not on file      Lack of Transportation (Non-Medical):  Not on file  Physical Activity:       Days of Exercise per Week: Not on file      Minutes of Exercise per Session: Not on file  Stress:       Feeling of Stress : Not on file  Social Connections:       Frequency of Communication with Friends and Family: Not on file      Frequency of Social Gatherings with Friends and Family: Not on file      Attends [employer] Services: Not on file      Active Member of [address] [place] [address] or Organizations: Not on file      Attends Club or Organization Meetings: Not on file      Marital Status: Not on file  Intimate Partner Violence:       Fear of Current or Ex-Partner: Not on file      Emotionally Abused: Not on file      Physically Abused: Not on file      Sexually Abused: Not on file  Housing Stability:       Unable to Pay for Housing in the Last Year: Not on file      Number of Places Lived in the Last Year: Not on file      Unstable Housing in the Last Year: Not on file    Review of patient's family history indicates:  Problem: Diabetes      Relation: Mother          Age of Onset: (Not Specified)          Comment: DM II  Problem: High Cholesterol      Relation: Mother          Age of Onset: (Not Specified)  Problem: High Blood Pressure      Relation: Mother          Age of Onset: (Not Specified)  Problem: Stroke      Relation: Mother          Age of Onset: 61  Problem: Heart Disease      Relation: Mother          Age of Onset: (Not Specified)          Comment: CAD with stents  Problem: Cancer      Relation: Mother          Age of Onset: 68          Comment: bladder CA with spread to the lymph nodes  Problem: High Cholesterol      Relation: Father          Age of Onset: (Not Specified)  Problem: Diabetes      Relation: Father          Age of Onset: (Not Specified)  Problem: High Blood Pressure      Relation: Father          Age of Onset: (Not Specified)  Problem: Kidney Disease      Relation: Father          Age of Onset: (Not Specified)          Comment: on dialysis  Problem: Heart Failure      Relation: Father          Age of Onset: (Not Specified)  Problem: High Blood Pressure      Relation: Sister          Age of Onset: (Not Specified)  Problem: No Known Problems      Relation: Brother          Age of Onset: (Not Specified)      Current Outpatient Medications on File Prior to Visit:  benazepril-hydrochlorthiazide (LOTENSIN HCT) 20-12.5 MG per tablet, TAKE 1 TABLET TWICE A DAY FOR BLOOD PRESSURE, Disp: 180 tablet, Rfl: 0  metoprolol tartrate (LOPRESSOR) 25 MG tablet, TAKE 1 TABLET TWICE A DAY, Disp: 180 tablet, Rfl: 0  rosuvastatin (CRESTOR) 10 MG tablet, TAKE 1 TABLET NIGHTLY, Disp: 90 tablet, Rfl: 0  amLODIPine (NORVASC) 5 MG tablet, TAKE 1 TABLET DAILY, Disp: 90 tablet, Rfl: 0  methocarbamol (ROBAXIN) 750 MG tablet, Take 1 tablet by mouth daily At bedtime for muscle relaxant (back spasms), Disp: 30 tablet, Rfl: 2    No current facility-administered medications on file prior to visit.       No Known Allergies      Lab Results       Component                Value               Date                       NA                       138                 09/24/2021                 K                        3.9                 09/24/2021                 CL                       99                  09/24/2021                 CO2                      30                  09/24/2021                 BUN                      7                   09/24/2021                 CREATININE               0.73                09/24/2021                 GLUCOSE                  106 (H)             09/24/2021                 CALCIUM                  9.6                 09/24/2021                 PROT                     7.1                 04/12/2022                 LABALBU                  4.5                 04/12/2022                 BILITOT                  1.46 (H)            04/12/2022                 ALKPHOS                  57                  04/12/2022                 AST                      41 (H)              04/12/2022                 ALT                      46 (H)              04/12/2022                 LABGLOM                  >60                 09/24/2021                 GFRAA                    >60                 09/24/2021                 GLOB                     NOT REPORTED        08/28/2020              Lab Results       Component                Value               Date                       LABA1C                   5.3                 02/21/2019            No results found for: EAG    Lab Results       Component                Value               Date                       CHOL                     237 (H)             09/24/2021                 CHOL                     217 (H)             06/19/2020                 CHOL                     269 (H)             02/21/2019            Lab Results       Component Value               Date                       TRIG                     152 (H)             09/24/2021                 TRIG                     240 (H)             06/19/2020                 TRIG                     180 (H)             02/21/2019            Lab Results       Component                Value               Date                       HDL                      51                  09/24/2021                 HDL                      47                  06/19/2020                 HDL                      48                  02/21/2019            Lab Results       Component                Value               Date                       LDLCHOLESTEROL           156 (H)             09/24/2021                 LDLCHOLESTEROL           122                 06/19/2020                 LDLCALC                  185 (A)             02/21/2019                 LDLCALC                  102                 12/29/2016                 LDLCALC                  162 (A)             11/13/2014            Lab Results       Component                Value               Date                       VLDL                     NOT REPORTED        09/24/2021                 VLDL                                         06/19/2020             NOT REPORTED (H)       VLDL                     23                  12/29/2016            Lab Results       Component                Value               Date                       CHOLHDLRATIO             4.6                 09/24/2021                 CHOLHDLRATIO             4.6                 06/19/2020                 CHOLHDLRATIO             5.6 (H)             02/21/2019                              Hyperlipidemia  This is a chronic problem. The current episode started more than 1 year ago. The problem is controlled. Recent lipid tests were reviewed and are normal. Pertinent negatives include no chest pain, myalgias or shortness of breath. Current antihyperlipidemic treatment includes statins.  The current treatment provides significant improvement of lipids. Hypertension  This is a chronic problem. The current episode started more than 1 year ago. The problem is unchanged. The problem is controlled. Pertinent negatives include no chest pain, neck pain, palpitations or shortness of breath. Past treatments include ACE inhibitors, diuretics, beta blockers and calcium channel blockers. The current treatment provides significant improvement. There are no compliance problems. There is no history of angina. Review of Systems   Constitutional: Negative. HENT: Negative for congestion, ear pain, postnasal drip, sneezing and sore throat. Eyes: Negative for visual disturbance. Respiratory: Negative. Negative for shortness of breath. Cardiovascular: Negative for chest pain, palpitations and leg swelling. Gastrointestinal: Negative for abdominal pain, blood in stool, constipation, diarrhea and nausea. Genitourinary: Negative for difficulty urinating, dysuria, frequency and urgency. Musculoskeletal: Negative for arthralgias, joint swelling, myalgias, neck pain and neck stiffness. Skin: Negative. Neurological: Negative for syncope. Psychiatric/Behavioral: Negative. Objective   Physical Exam  Vitals and nursing note reviewed. Constitutional:       Appearance: He is well-developed. HENT:      Head: Atraumatic. Eyes:      Conjunctiva/sclera: Conjunctivae normal.   Cardiovascular:      Rate and Rhythm: Normal rate and regular rhythm. Heart sounds: Normal heart sounds. Pulmonary:      Effort: Pulmonary effort is normal.      Breath sounds: Normal breath sounds. Abdominal:      Palpations: Abdomen is soft. Tenderness: There is no abdominal tenderness. Musculoskeletal:      Cervical back: Normal range of motion and neck supple. Lymphadenopathy:      Cervical: No cervical adenopathy. Skin:     Findings: No rash. Neurological:      Mental Status: He is alert. Psychiatric:         Behavior: Behavior normal.         Thought Content: Thought content normal.                  An electronic signature was used to authenticate this note.     --Sin Henderson MD

## 2022-04-12 NOTE — PATIENT INSTRUCTIONS
Survey: You may be receiving a survey from [employer] regarding your visit today. You may get this in the mail, through your MyChart or in your email. Please complete the survey to enable us to provide the highest quality of care to you and your family. Please also, mention our names. If you cannot score us as very good (5 Stars) on any question, please feel free to call the office to discuss how we could have made your experience exceptional.      Thank You! MD Priya Mcclain, CANDI Goyal, Rick Daniels, BSN RN    [facility], [address] [place] [address]      1. Primary hypertension  Controlled on Lotensin HCT, Lopressor, and Amlodipine. No change in medication. 2. Mixed hyperlipidemia  Controlled on Crestor. No change in medication. 3. Elevated LFTs  Discussed cutting back on Alcohol as it appears his LFT's are more associated with alcohol rather than fatty liver. 4. Fatty liver  Continue to work on wt loss and low fat diet. Hollie Cervantes was instructed to follow up in the clinic in 6 months for check up or as needed with any medical issues.

## 2022-06-23 DIAGNOSIS — M62.830 BACK SPASM: ICD-10-CM

## 2022-06-23 DIAGNOSIS — E78.2 MIXED HYPERCHOLESTEROLEMIA AND HYPERTRIGLYCERIDEMIA: ICD-10-CM

## 2022-06-23 DIAGNOSIS — I10 ESSENTIAL HYPERTENSION: ICD-10-CM

## 2022-06-23 RX ORDER — BENAZEPRIL HYDROCHLORIDE AND HYDROCHLOROTHIAZIDE 20; 12.5 MG/1; MG/1
TABLET ORAL
Qty: 180 TABLET | Refills: 3 | Status: SHIPPED | OUTPATIENT
Start: 2022-06-23

## 2022-06-23 RX ORDER — ROSUVASTATIN CALCIUM 10 MG/1
TABLET, COATED ORAL
Qty: 90 TABLET | Refills: 3 | Status: SHIPPED | OUTPATIENT
Start: 2022-06-23

## 2022-06-23 RX ORDER — AMLODIPINE BESYLATE 5 MG/1
TABLET ORAL
Qty: 90 TABLET | Refills: 3 | Status: SHIPPED | OUTPATIENT
Start: 2022-06-23

## 2022-06-23 RX ORDER — METHOCARBAMOL 750 MG/1
TABLET, FILM COATED ORAL
Qty: 30 TABLET | Refills: 11 | Status: SHIPPED | OUTPATIENT
Start: 2022-06-23

## 2022-06-23 NOTE — TELEPHONE ENCOUNTER
Health Maintenance   Topic Date Due    HIV screen  Never done    Hepatitis C screen  Never done    Shingles vaccine (1 of 2) Never done    Prostate Specific Antigen (PSA) Screening or Monitoring  09/24/2022    Lipids  04/12/2023    Depression Screen  04/12/2023    DTaP/Tdap/Td vaccine (2 - Td or Tdap) 11/22/2023    Diabetes screen  04/12/2025    Colorectal Cancer Screen  01/11/2026    Flu vaccine  Completed    COVID-19 Vaccine  Completed    Hepatitis A vaccine  Aged Out    Hepatitis B vaccine  Aged Out    Hib vaccine  Aged Out    Meningococcal (ACWY) vaccine  Aged Out    Pneumococcal 0-64 years Vaccine  Aged Out             (applicable per patient's age: Cancer Screenings, Depression Screening, Fall Risk Screening, Immunizations)    Hemoglobin A1C (%)   Date Value   04/12/2022 5.4   02/21/2019 5.3     LDL Cholesterol (mg/dL)   Date Value   04/12/2022 35     LDL Calculated (mg/dL)   Date Value   02/21/2019 185 (A)     AST (U/L)   Date Value   04/12/2022 41 (H)     ALT (U/L)   Date Value   04/12/2022 46 (H)     BUN (mg/dL)   Date Value   09/24/2021 7      (goal A1C is < 7)   (goal LDL is <100) need 30-50% reduction from baseline     BP Readings from Last 3 Encounters:   04/12/22 134/86   10/04/21 130/86   09/13/21 130/84    (goal /80)      All Future Testing planned in [facility]:  Lab Frequency Next Occurrence   Lipid Panel Once 04/13/2022   Hepatic Function Panel Once 04/13/2022   Hemoglobin A1C Once 04/13/2022   Comprehensive Metabolic Panel Once 98/65/0109   Lipid Panel Once 10/12/2022   PSA Screening Once 10/12/2022       Next Visit Date:  Future Appointments   Date Time Provider Alethea Arias   10/17/2022  3:00 PM [facility]            Patient Active Problem List:     Hypertension     Hyperlipidemia     Chronic midline low back pain without sciatica     Elevated LFTs     Fatty liver

## 2022-09-30 ENCOUNTER — HOSPITAL ENCOUNTER (OUTPATIENT)
Age: 57
Discharge: HOME | End: 2022-09-30
Payer: COMMERCIAL

## 2022-09-30 DIAGNOSIS — E78.2 MIXED HYPERLIPIDEMIA: ICD-10-CM

## 2022-09-30 DIAGNOSIS — I10 PRIMARY HYPERTENSION: ICD-10-CM

## 2022-09-30 DIAGNOSIS — Z12.5 PROSTATE CANCER SCREENING: ICD-10-CM

## 2022-09-30 DIAGNOSIS — R79.89 ELEVATED LFTS: ICD-10-CM

## 2022-09-30 LAB
ALBUMIN SERPL-MCNC: 4.4 G/DL (ref 3.5–5.2)
ALP BLD-CCNC: 59 U/L (ref 40–129)
ALT SERPL-CCNC: 42 U/L (ref 5–41)
ANION GAP SERPL CALCULATED.3IONS-SCNC: 7 MMOL/L (ref 9–17)
AST SERPL-CCNC: 37 U/L
BILIRUB SERPL-MCNC: 0.6 MG/DL (ref 0.3–1.2)
BUN BLDV-MCNC: 10 MG/DL (ref 6–20)
BUN/CREAT BLD: 14 (ref 9–20)
CALCIUM SERPL-MCNC: 9.6 MG/DL (ref 8.6–10.4)
CHLORIDE BLD-SCNC: 101 MMOL/L (ref 98–107)
CHOLESTEROL/HDL RATIO: 3.3
CHOLESTEROL: 130 MG/DL
CO2: 32 MMOL/L (ref 20–31)
CREAT SERPL-MCNC: 0.73 MG/DL (ref 0.7–1.2)
GFR AFRICAN AMERICAN: >60 ML/MIN
GFR NON-AFRICAN AMERICAN: >60 ML/MIN
GFR SERPL CREATININE-BSD FRML MDRD: ABNORMAL ML/MIN/{1.73_M2}
GLUCOSE BLD-MCNC: 112 MG/DL (ref 70–99)
HDLC SERPL-MCNC: 40 MG/DL
LDL CHOLESTEROL: 49 MG/DL (ref 0–130)
PATIENT FASTING?: YES
POTASSIUM SERPL-SCNC: 3.7 MMOL/L (ref 3.7–5.3)
PROSTATE SPECIFIC ANTIGEN: 0.64 NG/ML
SODIUM BLD-SCNC: 140 MMOL/L (ref 135–144)
TOTAL PROTEIN: 7 G/DL (ref 6.4–8.3)
TRIGL SERPL-MCNC: 204 MG/DL

## 2022-09-30 PROCEDURE — 80061 LIPID PANEL: CPT

## 2022-09-30 PROCEDURE — 36415 COLL VENOUS BLD VENIPUNCTURE: CPT

## 2022-09-30 PROCEDURE — 80053 COMPREHEN METABOLIC PANEL: CPT

## 2022-09-30 PROCEDURE — G0103 PSA SCREENING: HCPCS

## 2022-10-17 ENCOUNTER — OFFICE VISIT (OUTPATIENT)
Dept: FAMILY MEDICINE CLINIC | Age: 57
End: 2022-10-17
Payer: COMMERCIAL

## 2022-10-17 VITALS
WEIGHT: 199 LBS | HEART RATE: 68 BPM | BODY MASS INDEX: 30.16 KG/M2 | DIASTOLIC BLOOD PRESSURE: 88 MMHG | SYSTOLIC BLOOD PRESSURE: 134 MMHG | HEIGHT: 68 IN

## 2022-10-17 DIAGNOSIS — I10 PRIMARY HYPERTENSION: Primary | ICD-10-CM

## 2022-10-17 DIAGNOSIS — F41.8 SITUATIONAL ANXIETY: ICD-10-CM

## 2022-10-17 DIAGNOSIS — R73.9 HYPERGLYCEMIA: ICD-10-CM

## 2022-10-17 DIAGNOSIS — K76.0 FATTY LIVER: ICD-10-CM

## 2022-10-17 DIAGNOSIS — E78.2 MIXED HYPERLIPIDEMIA: ICD-10-CM

## 2022-10-17 PROCEDURE — G8417 CALC BMI ABV UP PARAM F/U: HCPCS | Performed by: INTERNAL MEDICINE

## 2022-10-17 PROCEDURE — 3017F COLORECTAL CA SCREEN DOC REV: CPT | Performed by: INTERNAL MEDICINE

## 2022-10-17 PROCEDURE — G8427 DOCREV CUR MEDS BY ELIG CLIN: HCPCS | Performed by: INTERNAL MEDICINE

## 2022-10-17 PROCEDURE — 99214 OFFICE O/P EST MOD 30 MIN: CPT | Performed by: INTERNAL MEDICINE

## 2022-10-17 PROCEDURE — G8484 FLU IMMUNIZE NO ADMIN: HCPCS | Performed by: INTERNAL MEDICINE

## 2022-10-17 PROCEDURE — 1036F TOBACCO NON-USER: CPT | Performed by: INTERNAL MEDICINE

## 2022-10-17 RX ORDER — ALPRAZOLAM 0.5 MG/1
0.5 TABLET ORAL 3 TIMES DAILY PRN
Qty: 2 TABLET | Refills: 0 | Status: SHIPPED | OUTPATIENT
Start: 2022-10-17 | End: 2022-10-19

## 2022-10-17 SDOH — ECONOMIC STABILITY: FOOD INSECURITY: WITHIN THE PAST 12 MONTHS, THE FOOD YOU BOUGHT JUST DIDN'T LAST AND YOU DIDN'T HAVE MONEY TO GET MORE.: NEVER TRUE

## 2022-10-17 SDOH — ECONOMIC STABILITY: FOOD INSECURITY: WITHIN THE PAST 12 MONTHS, YOU WORRIED THAT YOUR FOOD WOULD RUN OUT BEFORE YOU GOT MONEY TO BUY MORE.: NEVER TRUE

## 2022-10-17 ASSESSMENT — ENCOUNTER SYMPTOMS
DIARRHEA: 0
SORE THROAT: 0
ABDOMINAL PAIN: 0
CONSTIPATION: 0
NAUSEA: 0
SHORTNESS OF BREATH: 0
BLOOD IN STOOL: 0
RESPIRATORY NEGATIVE: 1

## 2022-10-17 ASSESSMENT — SOCIAL DETERMINANTS OF HEALTH (SDOH): HOW HARD IS IT FOR YOU TO PAY FOR THE VERY BASICS LIKE FOOD, HOUSING, MEDICAL CARE, AND HEATING?: NOT VERY HARD

## 2022-10-17 NOTE — PATIENT INSTRUCTIONS
Survey: You may be receiving a survey from [employer] regarding your visit today. You may get this in the mail, through your MyChart or in your email. Please complete the survey to enable us to provide the highest quality of care to you and your family. Please also, mention our names. If you cannot score us as very good (5 Stars) on any question, please feel free to call the office to discuss how we could have made your experience exceptional.      Thank You! MD Jos Beck, Silvana Burton, MATHIEUN RN    [facility], [facility]     1. Situational anxiety  Will be flying to [place] with anxiety associated with flying. Take Xanax 0.5 mg 20 minutes before boarding the plane.  - ALPRAZolam (XANAX) 0.5 MG tablet; Take 1 tablet by mouth 3 times daily as needed for Sleep or Anxiety for up to 2 days. Dispense: 2 tablet; Refill: 0    2. Primary hypertension  Controlled on Lopressor, Amlodipine, Lotensin HCTZ. No change in medication.  - Comprehensive Metabolic Panel; Future    3. Mixed hyperlipidemia  Controlled on Crestor. No change in medication. Obtain labs approximately one week prior to the office appointment. Please fast for 12 hours prior to obtaining the labs. Water or black coffee (no cream or sugar) is allowed prior to the the labs. - Comprehensive Metabolic Panel; Future  - Lipid Panel; Future    4. Fatty liver  Continue to work on wt loss / low fat diet. 5. Hyperglycemia  HgbA1C with next labs. - Comprehensive Metabolic Panel; Future  - Hemoglobin A1C; Future    Jamaal Ciara was instructed to follow up in the clinic in 6 months for check up or as needed with any medical issues.

## 2022-10-17 NOTE — PROGRESS NOTES
Renato Monet (:  1965) is a 60 y.o. male,Established patient, here for evaluation of the following chief complaint(s):  Hypertension (6 month check up. Discuss labs.), Hyperlipidemia, and Travel Consult (Questions anxiety med for upcoming travel. )         ASSESSMENT/PLAN:  1. Primary hypertension  -     Comprehensive Metabolic Panel; Future  2. Situational anxiety  -     ALPRAZolam (XANAX) 0.5 MG tablet; Take 1 tablet by mouth 3 times daily as needed for Sleep or Anxiety for up to 2 days. , Disp-2 tablet, R-0Normal  3. Mixed hyperlipidemia  -     Comprehensive Metabolic Panel; Future  -     Lipid Panel; Future  4. Fatty liver  5. Hyperglycemia  -     Comprehensive Metabolic Panel; Future  -     Hemoglobin A1C; Future      Plan:  1. Situational anxiety  Will be flying to [place] with anxiety associated with flying. Take Xanax 0.5 mg 20 minutes before boarding the plane.  - ALPRAZolam (XANAX) 0.5 MG tablet; Take 1 tablet by mouth 3 times daily as needed for Sleep or Anxiety for up to 2 days. Dispense: 2 tablet; Refill: 0    2. Primary hypertension  Controlled on Lopressor, Amlodipine, Lotensin HCTZ. No change in medication.  - Comprehensive Metabolic Panel; Future    3. Mixed hyperlipidemia  Controlled on Crestor. No change in medication. Obtain labs approximately one week prior to the office appointment. Please fast for 12 hours prior to obtaining the labs. Water or black coffee (no cream or sugar) is allowed prior to the the labs. - Comprehensive Metabolic Panel; Future  - Lipid Panel; Future    4. Fatty liver  Continue to work on wt loss / low fat diet. 5. Hyperglycemia  HgbA1C with next labs. - Comprehensive Metabolic Panel; Future  - Hemoglobin A1C; Future    Shelly Ren was instructed to follow up in the clinic in 6 months for check up or as needed with any medical issues.                  Subjective   SUBJECTIVE/OBJECTIVE:  Shelly Ren presents for a check up on his medical conditions HTN, Hyperlipidemia. Jamaal Urbina denies new problems. Medications were reviewed with Jamaal Urbina, he is  tolerating the medication. Bowels are  regular. There has not been rectal bleeding. Jamaal Urbina denies urinary complications, the urine stream is good. Jamaal Urbina denies chest pain and denies increasing shortness of breath. Labs from  reviewed. Past Medical History:  2020: Elevated LFTs      Comment:  US with fatty liver. Jhonatan Arriola continues to drink alcohol.   2008: Hx of exercise stress test      Comment:  negative  No date: Hyperlipidemia      Comment:  LDL goal 100  No date: Hypertension    Past Surgical History:  2021: COLONOSCOPY; N/A      Comment:  Dr. Rohith Brody:  1 adenomatous polyp  No date: CYST REMOVAL      Comment:  Left wrist    Social History    Socioeconomic History      Marital status:       Spouse name: Not on file      Number of children: 1      Years of education: 12      Highest education level: High school graduate    Occupational History      Not on file    Tobacco Use      Smoking status: Former        Packs/day: 0.50        Years: 25.00        Pack years: 12.5        Types: Cigarettes        Quit date:         Years since quittin.7      Smokeless tobacco: Never    Vaping Use      Vaping Use: Former    Substance and Sexual Activity      Alcohol use: Yes        Comment: about 3 beers a day roughly      Drug use: No      Sexual activity: Not on file    Other Topics      Concerns:        Not on file    Social History Narrative      Not on file    Social Determinants of Health  Financial Resource Strain: Low Risk       Difficulty of Paying Living Expenses: Not very hard  Food Insecurity: No Food Insecurity      Worried About Running Out of Food in the Last Year: Never true      Ran Out of Food in the Last Year: Never true  Transportation Needs: Not on file  Physical Activity: Not on file  Stress: Not on file  Social Connections: Not on file  Intimate Partner Violence: Not on file  Housing Stability: Not on file    Review of patient's family history indicates:  Problem: Diabetes      Relation: Mother          Age of Onset: (Not Specified)          Comment: DM II  Problem: High Cholesterol      Relation: Mother          Age of Onset: (Not Specified)  Problem: High Blood Pressure      Relation: Mother          Age of Onset: (Not Specified)  Problem: Stroke      Relation: Mother          Age of Onset: 61  Problem: Heart Disease      Relation: Mother          Age of Onset: (Not Specified)          Comment: CAD with stents  Problem: Cancer      Relation: Mother          Age of Onset: 68          Comment: bladder CA with spread to the lymph nodes  Problem: High Cholesterol      Relation: Father          Age of Onset: (Not Specified)  Problem: Diabetes      Relation: Father          Age of Onset: (Not Specified)  Problem: High Blood Pressure      Relation: Father          Age of Onset: (Not Specified)  Problem: Kidney Disease      Relation: Father          Age of Onset: (Not Specified)          Comment: on dialysis  Problem: Heart Failure      Relation: Father          Age of Onset: (Not Specified)  Problem: High Blood Pressure      Relation: Sister          Age of Onset: (Not Specified)  Problem: No Known Problems      Relation: Brother          Age of Onset: (Not Specified)      Current Outpatient Medications on File Prior to Visit:  rosuvastatin (CRESTOR) 10 MG tablet, TAKE 1 TABLET NIGHTLY, Disp: 90 tablet, Rfl: 3  metoprolol tartrate (LOPRESSOR) 25 MG tablet, TAKE 1 TABLET TWICE A DAY, Disp: 180 tablet, Rfl: 3  amLODIPine (NORVASC) 5 MG tablet, TAKE 1 TABLET DAILY, Disp: 90 tablet, Rfl: 3  benazepril-hydrochlorthiazide (LOTENSIN HCT) 20-12.5 MG per tablet, TAKE 1 TABLET TWICE A DAY FOR BLOOD PRESSURE, Disp: 180 tablet, Rfl: 3  methocarbamol (ROBAXIN) 750 MG tablet, TAKE 1 TABLET DAILY AT BEDTIME FOR MUSCLE RELAXANT (BACK SPASMS), Disp: 30 tablet, Rfl: 11    No current facility-administered medications on file prior to visit.       No Known Allergies      Lab Results       Component                Value               Date                       NA                       140                 09/30/2022                 K                        3.7                 09/30/2022                 CL                       101                 09/30/2022                 CO2                      32 (H)              09/30/2022                 BUN                      10                  09/30/2022                 CREATININE               0.73                09/30/2022                 GLUCOSE                  112 (H)             09/30/2022                 CALCIUM                  9.6                 09/30/2022                 PROT                     7.0                 09/30/2022                 LABALBU                  4.4                 09/30/2022                 BILITOT                  0.6                 09/30/2022                 ALKPHOS                  59                  09/30/2022                 AST                      37                  09/30/2022                 ALT                      42 (H)              09/30/2022                 LABGLOM                  >60                 09/30/2022                 GFRAA                    >60                 09/30/2022                 GLOB                     NOT REPORTED        08/28/2020              Lab Results       Component                Value               Date                       LABA1C                   5.4                 04/12/2022            Lab Results       Component                Value               Date                       EAG                      108                 04/12/2022              Lab Results       Component                Value               Date                       CHOL                     130                 09/30/2022                 CHOL                     148                 04/12/2022                 CHOL 237 (H)             09/24/2021            Lab Results       Component                Value               Date                       TRIG                     204 (H)             09/30/2022                 TRIG                     342 (H)             04/12/2022                 TRIG                     152 (H)             09/24/2021            Lab Results       Component                Value               Date                       HDL                      40 (L)              09/30/2022                 HDL                      45                  04/12/2022                 HDL                      51                  09/24/2021            Lab Results       Component                Value               Date                       LDLCHOLESTEROL           49                  09/30/2022                 LDLCHOLESTEROL           35                  04/12/2022                 LDLCHOLESTEROL           156 (H)             09/24/2021                 LDLCALC                  185 (A)             02/21/2019                 LDLCALC                  102                 12/29/2016                 LDLCALC                  162 (A)             11/13/2014            Lab Results       Component                Value               Date                       VLDL                     NOT REPORTED        09/24/2021                 VLDL                                         06/19/2020             NOT REPORTED (H)       VLDL                     23                  12/29/2016            Lab Results       Component                Value               Date                       CHOLHDLRATIO             3.3                 09/30/2022                 CHOLHDLRATIO             3.3                 04/12/2022                 CHOLHDLRATIO             4.6                 09/24/2021                                Hypertension  This is a chronic problem. The current episode started more than 1 year ago. The problem is unchanged. The problem is controlled. Pertinent negatives include no chest pain, neck pain, palpitations or shortness of breath. Past treatments include ACE inhibitors, diuretics, calcium channel blockers and beta blockers. The current treatment provides significant improvement. There are no compliance problems. There is no history of angina. Hyperlipidemia  This is a chronic problem. The current episode started more than 1 year ago. The problem is controlled. Recent lipid tests were reviewed and are normal. Pertinent negatives include no chest pain, myalgias or shortness of breath. Current antihyperlipidemic treatment includes statins. The current treatment provides significant improvement of lipids. There are no compliance problems. Review of Systems   Constitutional: Negative. HENT:  Negative for congestion, ear pain, postnasal drip, sneezing and sore throat. Eyes:  Negative for visual disturbance. Respiratory: Negative. Negative for shortness of breath. Cardiovascular:  Negative for chest pain, palpitations and leg swelling. Gastrointestinal:  Negative for abdominal pain, blood in stool, constipation, diarrhea and nausea. Genitourinary:  Negative for difficulty urinating, dysuria, frequency and urgency. Musculoskeletal:  Negative for arthralgias, joint swelling, myalgias, neck pain and neck stiffness. Skin: Negative. Neurological:  Negative for syncope. Psychiatric/Behavioral: Negative. Objective   Physical Exam  Vitals and nursing note reviewed. Constitutional:       Appearance: He is well-developed. HENT:      Head: Atraumatic. Eyes:      Conjunctiva/sclera: Conjunctivae normal.   Cardiovascular:      Rate and Rhythm: Normal rate and regular rhythm. Heart sounds: Normal heart sounds. Pulmonary:      Effort: Pulmonary effort is normal.      Breath sounds: Normal breath sounds. Abdominal:      Palpations: Abdomen is soft. Tenderness: There is no abdominal tenderness.    Musculoskeletal: Cervical back: Normal range of motion and neck supple. Lymphadenopathy:      Cervical: No cervical adenopathy. Skin:     Findings: No rash. Neurological:      Mental Status: He is alert. Psychiatric:         Behavior: Behavior normal.         Thought Content: Thought content normal.                An electronic signature was used to authenticate this note.     --Emmanuel Hernandez MD

## 2023-03-17 ENCOUNTER — OFFICE VISIT (OUTPATIENT)
Dept: PRIMARY CARE CLINIC | Age: 58
End: 2023-03-17
Payer: COMMERCIAL

## 2023-03-17 VITALS
WEIGHT: 199 LBS | TEMPERATURE: 98.5 F | BODY MASS INDEX: 30.16 KG/M2 | HEIGHT: 68 IN | DIASTOLIC BLOOD PRESSURE: 93 MMHG | SYSTOLIC BLOOD PRESSURE: 150 MMHG | RESPIRATION RATE: 18 BRPM | OXYGEN SATURATION: 96 % | HEART RATE: 82 BPM

## 2023-03-17 DIAGNOSIS — R05.1 ACUTE COUGH: ICD-10-CM

## 2023-03-17 DIAGNOSIS — J01.40 ACUTE PANSINUSITIS, RECURRENCE NOT SPECIFIED: Primary | ICD-10-CM

## 2023-03-17 LAB
INFLUENZA A ANTIBODY: NEGATIVE
INFLUENZA B ANTIBODY: NEGATIVE

## 2023-03-17 PROCEDURE — 3080F DIAST BP >= 90 MM HG: CPT | Performed by: NURSE PRACTITIONER

## 2023-03-17 PROCEDURE — G8417 CALC BMI ABV UP PARAM F/U: HCPCS | Performed by: NURSE PRACTITIONER

## 2023-03-17 PROCEDURE — 87804 INFLUENZA ASSAY W/OPTIC: CPT | Performed by: NURSE PRACTITIONER

## 2023-03-17 PROCEDURE — 3017F COLORECTAL CA SCREEN DOC REV: CPT | Performed by: NURSE PRACTITIONER

## 2023-03-17 PROCEDURE — G8427 DOCREV CUR MEDS BY ELIG CLIN: HCPCS | Performed by: NURSE PRACTITIONER

## 2023-03-17 PROCEDURE — 99213 OFFICE O/P EST LOW 20 MIN: CPT | Performed by: NURSE PRACTITIONER

## 2023-03-17 PROCEDURE — 1036F TOBACCO NON-USER: CPT | Performed by: NURSE PRACTITIONER

## 2023-03-17 PROCEDURE — 3077F SYST BP >= 140 MM HG: CPT | Performed by: NURSE PRACTITIONER

## 2023-03-17 PROCEDURE — G8484 FLU IMMUNIZE NO ADMIN: HCPCS | Performed by: NURSE PRACTITIONER

## 2023-03-17 RX ORDER — BENZONATATE 100 MG/1
100 CAPSULE ORAL 3 TIMES DAILY PRN
Qty: 21 CAPSULE | Refills: 0 | Status: SHIPPED | OUTPATIENT
Start: 2023-03-17 | End: 2023-03-24

## 2023-03-17 RX ORDER — DOXYCYCLINE 100 MG/1
100 CAPSULE ORAL 2 TIMES DAILY
Qty: 14 CAPSULE | Refills: 0 | Status: SHIPPED | OUTPATIENT
Start: 2023-03-17 | End: 2023-03-24

## 2023-03-17 ASSESSMENT — ENCOUNTER SYMPTOMS
SINUS PAIN: 1
SORE THROAT: 1
COUGH: 1
GASTROINTESTINAL NEGATIVE: 1
SINUS PRESSURE: 1

## 2023-03-17 NOTE — PATIENT INSTRUCTIONS
SURVEY:    You may be receiving a survey from [employer] regarding your visit today. Please complete the survey to enable us to provide the highest quality of care to you and your family. If you cannot score us a very good on any question, please call the office to discuss how we could of made your experience a very good one. Thank you for letting us take care of you today. We hope all your questions were addressed. If a question was overlooked or something else comes to mind after you return home, please contact a member of your Care Team listed below.     Thank you,  Sd López MA      Your Care Team at [address] [facility]  Provider- TREVOR Pennington  Provider- TREVOR De Leon  [address], [address] [facility]  Reception- Katie Stark, [address] [facility]      Walk-in contact numbers:       Phone: [phone]                 Fax: [phone]    Keyla Walker Hours:  Mon-Thurs: 9:00 am - 5:30 pm     Friday: 8:00 am - 12:00 pm           Sat-Sun: CLOSED

## 2023-03-17 NOTE — PROGRESS NOTES
Chief Complaint:   URI (Started Tuesday-scratchy throat (gone now), cough, rib pain, nasal congestion)      History of Present Illness   Source of history provided by:  patient. Arthur Eaton is a 59 y.o. old male with a past medical history of:   Past Medical History:   Diagnosis Date    Elevated LFTs 07/2020    US with fatty liver. Evin Howard continues to drink alcohol. Hx of exercise stress test 2008    negative    Hyperlipidemia     LDL goal 100    Hypertension     Presents to the walk in clinic for evaluation of sinus pressure, nasal congestion, discolored nasal drainage, bilateral ear pressure, productive cough, chest congestion, and sore throat x 4 days that has rapidly worsened. Girma Guido reports the cough is productive and is now causing bilateral upper back/rib pain that he believes is from the coughing. Has been taking Mucinex, Robitussin cough and cold OTC without relief. According to Ernestina Diamond, he had leftover cefdinir reporting only 1 pill that he started yesterday. He has also tried leftover Tessalon Perles. ROS   Review of Systems   Constitutional:  Positive for fever. HENT:  Positive for congestion, sinus pressure, sinus pain and sore throat. Respiratory:  Positive for cough. Cardiovascular: Negative. Gastrointestinal: Negative. Skin:  Negative for rash. Past Surgical History:  has a past surgical history that includes cyst removal and Colonoscopy (N/A, 01/11/2021). Social History:  reports that he quit smoking about 7 years ago. His smoking use included cigarettes. He has a 12.50 pack-year smoking history. He has never used smokeless tobacco. He reports current alcohol use. He reports that he does not use drugs.   Family History: family history includes Cancer (age of onset: 68) in his mother; Diabetes in his father and mother; Heart Disease in his mother; Heart Failure in his father; High Blood Pressure in his father, mother, and sister; High Cholesterol in his father and mother; Kidney Disease in his father; No Known Problems in his brother; Stroke (age of onset: 61) in his mother. Allergies: Patient has no known allergies. Physical Exam       VS:  BP (!) 150/93   Pulse 82   Temp 98.5 °F (36.9 °C) (Oral)   Resp 18   Ht 5' 8\" (1.727 m)   Wt 199 lb (90.3 kg)   SpO2 96%   BMI 30.26 kg/m²        Constitutional:  Alert, development consistent with age. Appears to not feel well. Head: No TTP over the bilateral sinuses. Ears:   Bilateral pinna normal. TMs without erythema or perforation bilaterally. Canals normal bilaterally without swelling or exudate  Nose:  Purulent congestion of the nasal mucosa. There is injection to middle turbinates bilaterally. Throat: Moderate posterior pharyngeal erythema with mild post nasal drip present. No exudate or tonsillar hypertrophy noted. Neck:  Supple. There is no anterior cervical adenopathy. Lungs: Diminished left posterior lower field but CTAB without wheezes, rales, or rhonchi. Heart:  Regular rate and rhythm, normal heart sounds, without ectopy, gallops, or rubs. Skin:  Normal turgor. Warm, dry, without visible rash. Neurological:  Alert and oriented. Motor functions intact. Lab / Imaging Results   (All laboratory and radiology results have been personally reviewed by myself)  Labs:  Results for orders placed or performed in visit on 03/17/23   POCT Influenza A/B   Result Value Ref Range    Influenza A Ab Negative     Influenza B Ab Negative        Assessment / Plan     Impression(s):  Luc Dowling was seen today for uri. Diagnoses and all orders for this visit:    Acute pansinusitis, recurrence not specified  -     benzonatate (TESSALON PERLES) 100 MG capsule; Take 1 capsule by mouth 3 times daily as needed for Cough  -     doxycycline monohydrate (MONODOX) 100 MG capsule;  Take 1 capsule by mouth 2 times daily for 7 days    Acute cough  -     POCT Influenza A/B        -Treating today with concern for bacterial sinusitis with co occurring bronchitis with complaints of reactive, productive cough with Doxycycline, administration and side effects discussed. Encourage that patient take with a probiotic and examples provided. -Tessalon Perles prescribed to help with cough, administration and side effects discussed. - Encouraged he discontinue cough and cold medication that may contain decongestants. - Symptomatic treatment discussed including over-the-counter acetaminophen as labeled as needed for pain/fever. Good oral hydration. Diet as tolerated. - Follow-up with PCP if no improvement. ED for any worsening or concern.       Howard Chan, APRN - CNP

## 2023-04-07 ENCOUNTER — HOSPITAL ENCOUNTER (OUTPATIENT)
Age: 58
Discharge: HOME | End: 2023-04-07
Payer: COMMERCIAL

## 2023-04-07 DIAGNOSIS — E78.2 MIXED HYPERLIPIDEMIA: ICD-10-CM

## 2023-04-07 DIAGNOSIS — R73.9 HYPERGLYCEMIA: ICD-10-CM

## 2023-04-07 DIAGNOSIS — I10 PRIMARY HYPERTENSION: ICD-10-CM

## 2023-04-07 LAB
ALBUMIN SERPL-MCNC: 4.5 G/DL (ref 3.5–5.2)
ALP SERPL-CCNC: 57 U/L (ref 40–129)
ALT SERPL-CCNC: 40 U/L (ref 5–41)
ANION GAP SERPL CALCULATED.3IONS-SCNC: 10 MMOL/L (ref 9–17)
AST SERPL-CCNC: 38 U/L
BILIRUB SERPL-MCNC: 1.1 MG/DL (ref 0.3–1.2)
BUN SERPL-MCNC: 11 MG/DL (ref 6–20)
BUN/CREAT BLD: 13 (ref 9–20)
CALCIUM SERPL-MCNC: 9.4 MG/DL (ref 8.6–10.4)
CHLORIDE SERPL-SCNC: 100 MMOL/L (ref 98–107)
CHOLEST SERPL-MCNC: 133 MG/DL
CHOLESTEROL/HDL RATIO: 3.2
CO2 SERPL-SCNC: 30 MMOL/L (ref 20–31)
CREAT SERPL-MCNC: 0.88 MG/DL (ref 0.7–1.2)
EST. AVERAGE GLUCOSE BLD GHB EST-MCNC: 114 MG/DL
GFR SERPL CREATININE-BSD FRML MDRD: >60 ML/MIN/1.73M2
GLUCOSE SERPL-MCNC: 115 MG/DL (ref 70–99)
HBA1C MFR BLD: 5.6 % (ref 4–6)
HDLC SERPL-MCNC: 41 MG/DL
LDLC SERPL CALC-MCNC: 40 MG/DL (ref 0–130)
PATIENT FASTING?: YES
POTASSIUM SERPL-SCNC: 3.6 MMOL/L (ref 3.7–5.3)
PROT SERPL-MCNC: 7.2 G/DL (ref 6.4–8.3)
SODIUM SERPL-SCNC: 140 MMOL/L (ref 135–144)
TRIGL SERPL-MCNC: 259 MG/DL

## 2023-04-07 PROCEDURE — 80061 LIPID PANEL: CPT

## 2023-04-07 PROCEDURE — 36415 COLL VENOUS BLD VENIPUNCTURE: CPT

## 2023-04-07 PROCEDURE — 83036 HEMOGLOBIN GLYCOSYLATED A1C: CPT

## 2023-04-07 PROCEDURE — 80053 COMPREHEN METABOLIC PANEL: CPT

## 2023-04-14 SDOH — ECONOMIC STABILITY: HOUSING INSECURITY
IN THE LAST 12 MONTHS, WAS THERE A TIME WHEN YOU DID NOT HAVE A STEADY PLACE TO SLEEP OR SLEPT IN A SHELTER (INCLUDING NOW)?: PATIENT REFUSED

## 2023-04-14 SDOH — ECONOMIC STABILITY: FOOD INSECURITY: WITHIN THE PAST 12 MONTHS, YOU WORRIED THAT YOUR FOOD WOULD RUN OUT BEFORE YOU GOT MONEY TO BUY MORE.: PATIENT DECLINED

## 2023-04-14 SDOH — ECONOMIC STABILITY: TRANSPORTATION INSECURITY
IN THE PAST 12 MONTHS, HAS LACK OF TRANSPORTATION KEPT YOU FROM MEETINGS, WORK, OR FROM GETTING THINGS NEEDED FOR DAILY LIVING?: PATIENT DECLINED

## 2023-04-14 SDOH — ECONOMIC STABILITY: INCOME INSECURITY: HOW HARD IS IT FOR YOU TO PAY FOR THE VERY BASICS LIKE FOOD, HOUSING, MEDICAL CARE, AND HEATING?: PATIENT DECLINED

## 2023-04-14 SDOH — ECONOMIC STABILITY: FOOD INSECURITY: WITHIN THE PAST 12 MONTHS, THE FOOD YOU BOUGHT JUST DIDN'T LAST AND YOU DIDN'T HAVE MONEY TO GET MORE.: PATIENT DECLINED

## 2023-04-17 ENCOUNTER — OFFICE VISIT (OUTPATIENT)
Dept: FAMILY MEDICINE CLINIC | Age: 58
End: 2023-04-17
Payer: COMMERCIAL

## 2023-04-17 VITALS
SYSTOLIC BLOOD PRESSURE: 152 MMHG | DIASTOLIC BLOOD PRESSURE: 94 MMHG | BODY MASS INDEX: 30.16 KG/M2 | HEART RATE: 78 BPM | WEIGHT: 199 LBS | HEIGHT: 68 IN

## 2023-04-17 DIAGNOSIS — M62.830 BACK SPASM: ICD-10-CM

## 2023-04-17 DIAGNOSIS — E78.2 MIXED HYPERCHOLESTEROLEMIA AND HYPERTRIGLYCERIDEMIA: ICD-10-CM

## 2023-04-17 DIAGNOSIS — R73.01 IFG (IMPAIRED FASTING GLUCOSE): Primary | ICD-10-CM

## 2023-04-17 DIAGNOSIS — Z12.5 PROSTATE CANCER SCREENING: ICD-10-CM

## 2023-04-17 DIAGNOSIS — I10 ESSENTIAL HYPERTENSION: ICD-10-CM

## 2023-04-17 PROCEDURE — 99214 OFFICE O/P EST MOD 30 MIN: CPT | Performed by: INTERNAL MEDICINE

## 2023-04-17 PROCEDURE — 3080F DIAST BP >= 90 MM HG: CPT | Performed by: INTERNAL MEDICINE

## 2023-04-17 PROCEDURE — G8427 DOCREV CUR MEDS BY ELIG CLIN: HCPCS | Performed by: INTERNAL MEDICINE

## 2023-04-17 PROCEDURE — 3017F COLORECTAL CA SCREEN DOC REV: CPT | Performed by: INTERNAL MEDICINE

## 2023-04-17 PROCEDURE — 1036F TOBACCO NON-USER: CPT | Performed by: INTERNAL MEDICINE

## 2023-04-17 PROCEDURE — 3077F SYST BP >= 140 MM HG: CPT | Performed by: INTERNAL MEDICINE

## 2023-04-17 PROCEDURE — G8417 CALC BMI ABV UP PARAM F/U: HCPCS | Performed by: INTERNAL MEDICINE

## 2023-04-17 RX ORDER — BENAZEPRIL HYDROCHLORIDE AND HYDROCHLOROTHIAZIDE 20; 12.5 MG/1; MG/1
1 TABLET ORAL DAILY
Qty: 90 TABLET | Refills: 3 | Status: SHIPPED | OUTPATIENT
Start: 2023-04-17

## 2023-04-17 RX ORDER — METHOCARBAMOL 750 MG/1
750 TABLET, FILM COATED ORAL DAILY PRN
Qty: 30 TABLET | Refills: 11 | Status: SHIPPED | OUTPATIENT
Start: 2023-04-17

## 2023-04-17 RX ORDER — ROSUVASTATIN CALCIUM 10 MG/1
10 TABLET, COATED ORAL NIGHTLY
Qty: 90 TABLET | Refills: 3 | Status: SHIPPED | OUTPATIENT
Start: 2023-04-17

## 2023-04-17 RX ORDER — AMLODIPINE BESYLATE 5 MG/1
5 TABLET ORAL DAILY
Qty: 90 TABLET | Refills: 3 | Status: SHIPPED | OUTPATIENT
Start: 2023-04-17

## 2023-04-17 ASSESSMENT — ENCOUNTER SYMPTOMS
BLOOD IN STOOL: 0
RESPIRATORY NEGATIVE: 1
DIARRHEA: 0
NAUSEA: 0
SORE THROAT: 0
SHORTNESS OF BREATH: 0
ABDOMINAL PAIN: 0
BACK PAIN: 1
CONSTIPATION: 0

## 2023-04-17 ASSESSMENT — PATIENT HEALTH QUESTIONNAIRE - PHQ9
SUM OF ALL RESPONSES TO PHQ QUESTIONS 1-9: 0
2. FEELING DOWN, DEPRESSED OR HOPELESS: 0
1. LITTLE INTEREST OR PLEASURE IN DOING THINGS: 0
SUM OF ALL RESPONSES TO PHQ QUESTIONS 1-9: 0
SUM OF ALL RESPONSES TO PHQ9 QUESTIONS 1 & 2: 0

## 2023-04-17 NOTE — PROGRESS NOTES
Neurological:      Mental Status: He is alert. Psychiatric:         Behavior: Behavior normal.         Thought Content: Thought content normal.                An electronic signature was used to authenticate this note.     --Jas Chanel MD

## 2023-04-17 NOTE — PATIENT INSTRUCTIONS
Survey: You may be receiving a survey from [employer] regarding your visit today. You may get this in the mail, through your MyChart or in your email. Please complete the survey to enable us to provide the highest quality of care to you and your family. Please also, mention our names. If you cannot score us as very good (5 Stars) on any question, please feel free to call the office to discuss how we could have made your experience exceptional.      Thank You! Dr. Louana Councilman, MD [facility], [address] [facility], [place] RN     Plan:  1. Back spasm  Controlled with Robaxin as needed. No change in dose. - methocarbamol (ROBAXIN) 750 MG tablet; Take 1 tablet by mouth daily as needed (prn back pain)  Dispense: 30 tablet; Refill: 11    2. Mixed hypercholesterolemia and hypertriglyceridemia  Controlled on Crestor. Watch a low fat diet. Obtain labs approximately one week prior to the office appointment. Please fast for 12 hours prior to obtaining the labs. Water or black coffee (no cream or sugar) is allowed prior to the the labs. - rosuvastatin (CRESTOR) 10 MG tablet; Take 1 tablet by mouth nightly  Dispense: 90 tablet; Refill: 3  - Comprehensive Metabolic Panel; Future  - Lipid Panel; Future    3. Essential hypertension  Since Columba Suarez had an elevated blood pressure in the clinic today, he is instructed to follow up in the clinic in 2 weeks for a repeat blood pressure check. Columba Suarez was instructed to take his blood pressure medication at least 2 hours before the appointment. Instructions were also given to avoid caffeine before the blood pressure evaluation and to follow a sodium restricted diet. Attempt to obtain blood pressure measurements outside of the clinic and bring these readings to your office appointment. - metoprolol tartrate (LOPRESSOR) 25 MG tablet; Take 1 tablet by mouth 2 times daily  Dispense: 180 tablet; Refill: 3  - amLODIPine (NORVASC) 5 MG tablet;  Take

## 2023-05-11 ENCOUNTER — NURSE ONLY (OUTPATIENT)
Dept: FAMILY MEDICINE CLINIC | Age: 58
End: 2023-05-11

## 2023-05-11 VITALS
HEIGHT: 68 IN | WEIGHT: 199 LBS | BODY MASS INDEX: 30.16 KG/M2 | HEART RATE: 69 BPM | DIASTOLIC BLOOD PRESSURE: 86 MMHG | SYSTOLIC BLOOD PRESSURE: 126 MMHG

## 2023-05-11 DIAGNOSIS — Z01.30 BP CHECK: Primary | ICD-10-CM

## 2023-05-11 NOTE — PROGRESS NOTES
Pt presents today for a blood pressure check. Denies HA, SOB, CP, or dizziness. BP's have been running- See Media  Have you had your Blood Pressure medications 2 hours Prior to this appointment? Yes  Any recent change in Blood Pressure medication?  No

## 2023-10-12 ENCOUNTER — HOSPITAL ENCOUNTER (OUTPATIENT)
Age: 58
Discharge: HOME | End: 2023-10-12
Payer: COMMERCIAL

## 2023-10-12 DIAGNOSIS — I10 ESSENTIAL HYPERTENSION: ICD-10-CM

## 2023-10-12 DIAGNOSIS — Z12.5 PROSTATE CANCER SCREENING: ICD-10-CM

## 2023-10-12 DIAGNOSIS — E78.2 MIXED HYPERCHOLESTEROLEMIA AND HYPERTRIGLYCERIDEMIA: ICD-10-CM

## 2023-10-12 DIAGNOSIS — R73.01 IFG (IMPAIRED FASTING GLUCOSE): ICD-10-CM

## 2023-10-12 LAB
ALBUMIN SERPL-MCNC: 4.3 G/DL (ref 3.5–5.2)
ALP SERPL-CCNC: 53 U/L (ref 40–129)
ALT SERPL-CCNC: 39 U/L (ref 5–41)
ANION GAP SERPL CALCULATED.3IONS-SCNC: 7 MMOL/L (ref 9–17)
AST SERPL-CCNC: 36 U/L
BILIRUB SERPL-MCNC: 1 MG/DL (ref 0.3–1.2)
BUN SERPL-MCNC: 11 MG/DL (ref 6–20)
BUN/CREAT SERPL: 14 (ref 9–20)
CALCIUM SERPL-MCNC: 9.7 MG/DL (ref 8.6–10.4)
CHLORIDE SERPL-SCNC: 99 MMOL/L (ref 98–107)
CHOLEST SERPL-MCNC: 141 MG/DL
CHOLESTEROL/HDL RATIO: 3.4
CO2 SERPL-SCNC: 31 MMOL/L (ref 20–31)
CREAT SERPL-MCNC: 0.8 MG/DL (ref 0.7–1.2)
EST. AVERAGE GLUCOSE BLD GHB EST-MCNC: 91 MG/DL
GFR SERPL CREATININE-BSD FRML MDRD: >60 ML/MIN/1.73M2
GLUCOSE SERPL-MCNC: 109 MG/DL (ref 70–99)
HBA1C MFR BLD: 4.8 % (ref 4–6)
HDLC SERPL-MCNC: 41 MG/DL
LDLC SERPL CALC-MCNC: 58 MG/DL (ref 0–130)
PATIENT FASTING?: YES
POTASSIUM SERPL-SCNC: 3.7 MMOL/L (ref 3.7–5.3)
PROT SERPL-MCNC: 7.1 G/DL (ref 6.4–8.3)
PSA SERPL-MCNC: 0.71 NG/ML
SODIUM SERPL-SCNC: 137 MMOL/L (ref 135–144)
TRIGL SERPL-MCNC: 212 MG/DL

## 2023-10-12 PROCEDURE — 36415 COLL VENOUS BLD VENIPUNCTURE: CPT

## 2023-10-12 PROCEDURE — 80053 COMPREHEN METABOLIC PANEL: CPT

## 2023-10-12 PROCEDURE — G0103 PSA SCREENING: HCPCS

## 2023-10-12 PROCEDURE — 83036 HEMOGLOBIN GLYCOSYLATED A1C: CPT

## 2023-10-12 PROCEDURE — 80061 LIPID PANEL: CPT

## 2023-10-17 ENCOUNTER — OFFICE VISIT (OUTPATIENT)
Dept: FAMILY MEDICINE CLINIC | Age: 58
End: 2023-10-17
Payer: COMMERCIAL

## 2023-10-17 VITALS
SYSTOLIC BLOOD PRESSURE: 134 MMHG | WEIGHT: 200 LBS | BODY MASS INDEX: 30.31 KG/M2 | DIASTOLIC BLOOD PRESSURE: 86 MMHG | HEIGHT: 68 IN

## 2023-10-17 DIAGNOSIS — I10 ESSENTIAL HYPERTENSION: ICD-10-CM

## 2023-10-17 DIAGNOSIS — L30.9 ECZEMA, UNSPECIFIED TYPE: Primary | ICD-10-CM

## 2023-10-17 DIAGNOSIS — R73.01 IFG (IMPAIRED FASTING GLUCOSE): ICD-10-CM

## 2023-10-17 DIAGNOSIS — E78.2 MIXED HYPERLIPIDEMIA: ICD-10-CM

## 2023-10-17 PROCEDURE — G8427 DOCREV CUR MEDS BY ELIG CLIN: HCPCS | Performed by: INTERNAL MEDICINE

## 2023-10-17 PROCEDURE — 1036F TOBACCO NON-USER: CPT | Performed by: INTERNAL MEDICINE

## 2023-10-17 PROCEDURE — 3017F COLORECTAL CA SCREEN DOC REV: CPT | Performed by: INTERNAL MEDICINE

## 2023-10-17 PROCEDURE — G8484 FLU IMMUNIZE NO ADMIN: HCPCS | Performed by: INTERNAL MEDICINE

## 2023-10-17 PROCEDURE — 3079F DIAST BP 80-89 MM HG: CPT | Performed by: INTERNAL MEDICINE

## 2023-10-17 PROCEDURE — G8417 CALC BMI ABV UP PARAM F/U: HCPCS | Performed by: INTERNAL MEDICINE

## 2023-10-17 PROCEDURE — 99214 OFFICE O/P EST MOD 30 MIN: CPT | Performed by: INTERNAL MEDICINE

## 2023-10-17 PROCEDURE — 3075F SYST BP GE 130 - 139MM HG: CPT | Performed by: INTERNAL MEDICINE

## 2023-10-17 RX ORDER — TRIAMCINOLONE ACETONIDE 1 MG/G
CREAM TOPICAL
Qty: 80 G | Refills: 2 | Status: SHIPPED | OUTPATIENT
Start: 2023-10-17

## 2023-10-17 RX ORDER — BENAZEPRIL HYDROCHLORIDE AND HYDROCHLOROTHIAZIDE 20; 12.5 MG/1; MG/1
1 TABLET ORAL DAILY
Qty: 180 TABLET | Refills: 3 | Status: SHIPPED | OUTPATIENT
Start: 2023-10-17

## 2023-10-17 ASSESSMENT — ENCOUNTER SYMPTOMS
SORE THROAT: 0
BLOOD IN STOOL: 0
NAUSEA: 0
ABDOMINAL PAIN: 0
SHORTNESS OF BREATH: 0
DIARRHEA: 0
CONSTIPATION: 0
RESPIRATORY NEGATIVE: 1

## 2023-10-17 NOTE — PROGRESS NOTES
Damien Shah (:  1965) is a 59 y.o. male,Established patient, here for evaluation of the following chief complaint(s):  Hypertension (Check up, review labs), Hyperglycemia, and Hyperlipidemia         ASSESSMENT/PLAN:  1. Eczema, unspecified type  -     triamcinolone (KENALOG) 0.1 % cream; Apply to the affected area 2 times a day., Disp-80 g, R-2, Normal  2. Essential hypertension  -     benazepril-hydrochlorthiazide (LOTENSIN HCT) 20-12.5 MG per tablet; Take 1 tablet by mouth daily, Disp-180 tablet, R-3Normal  -     Comprehensive Metabolic Panel; Future  3. IFG (impaired fasting glucose)  4. Mixed hyperlipidemia  -     Comprehensive Metabolic Panel; Future  -     Lipid Panel; Future      Plan:  1. Essential hypertension  Continue Lotensin HCT, Lopresor, and Amlodipine. No change in the dose. - benazepril-hydrochlorthiazide (LOTENSIN HCT) 20-12.5 MG per tablet; Take 1 tablet by mouth daily  Dispense: 180 tablet; Refill: 3  - Comprehensive Metabolic Panel; Future    2. Eczema, unspecified type  Use Kenalog 0.1% cream two times a day. - triamcinolone (KENALOG) 0.1 % cream; Apply to the affected area 2 times a day. Dispense: 80 g; Refill: 2    3. IFG (impaired fasting glucose)  HgbA1C has been normal.      4. Mixed hyperlipidemia  Controlled on Crestor. Obtain labs approximately one week prior to the office appointment. Please fast for 12 hours prior to obtaining the labs. Water or black coffee (no cream or sugar) is allowed prior to the the labs. - Comprehensive Metabolic Panel; Future  - Lipid Panel; Future      Alicia Barraza was instructed to follow up in the clinic in 6 months for check up or as needed with any medical issues. Subjective   SUBJECTIVE/OBJECTIVE:  Alicia Barraza presents for a check up on his medical conditions HTN, Hyperglycemia, Hyperlipidemia. Alicia Christi denies new problems. Medications were reviewed with Alicia Barraza, he is  tolerating the medication.   Bowels are

## 2023-10-17 NOTE — PATIENT INSTRUCTIONS
Survey: You may be receiving a survey from [facility] regarding your visit today. You may get this in the mail, through your MyChart or in your email. Please complete the survey to enable us to provide the highest quality of care to you and your family. Please also, mention our names. If you cannot score us as very good (5 Stars) on any question, please feel free to call the office to discuss how we could have made your experience exceptional.      Thank You! MD Betty Chandler, [facility] [employer], [facility], APRN [facility]    Cayla Artis, [address]       Plan:  1. Essential hypertension  Continue Lotensin HCT, Lopresor, and Amlodipine. No change in the dose. - benazepril-hydrochlorthiazide (LOTENSIN HCT) 20-12.5 MG per tablet; Take 1 tablet by mouth daily  Dispense: 180 tablet; Refill: 3  - Comprehensive Metabolic Panel; Future    2. Eczema, unspecified type  Use Kenalog 0.1% cream two times a day. - triamcinolone (KENALOG) 0.1 % cream; Apply to the affected area 2 times a day. Dispense: 80 g; Refill: 2    3. IFG (impaired fasting glucose)  HgbA1C has been normal.      4. Mixed hyperlipidemia  Controlled on Crestor. Obtain labs approximately one week prior to the office appointment. Please fast for 12 hours prior to obtaining the labs. Water or black coffee (no cream or sugar) is allowed prior to the the labs. - Comprehensive Metabolic Panel; Future  - Lipid Panel; Future      Colby Mathewsjudy was instructed to follow up in the clinic in 6 months for check up or as needed with any medical issues.

## 2023-11-20 DIAGNOSIS — I10 ESSENTIAL HYPERTENSION: ICD-10-CM

## 2023-11-20 RX ORDER — BENAZEPRIL HYDROCHLORIDE AND HYDROCHLOROTHIAZIDE 20; 12.5 MG/1; MG/1
1 TABLET ORAL 2 TIMES DAILY
Qty: 180 TABLET | Refills: 3 | Status: SHIPPED | OUTPATIENT
Start: 2023-11-20

## 2024-03-29 ENCOUNTER — HOSPITAL ENCOUNTER (OUTPATIENT)
Age: 59
Discharge: HOME | End: 2024-03-29
Payer: COMMERCIAL

## 2024-03-29 DIAGNOSIS — E78.2 MIXED HYPERLIPIDEMIA: ICD-10-CM

## 2024-03-29 DIAGNOSIS — I10 ESSENTIAL HYPERTENSION: ICD-10-CM

## 2024-03-29 LAB
ALBUMIN SERPL-MCNC: 4.4 G/DL (ref 3.5–5.2)
ALP SERPL-CCNC: 60 U/L (ref 40–129)
ALT SERPL-CCNC: 44 U/L (ref 5–41)
ANION GAP SERPL CALCULATED.3IONS-SCNC: 9 MMOL/L (ref 9–17)
AST SERPL-CCNC: 35 U/L
BILIRUB SERPL-MCNC: 1.3 MG/DL (ref 0.3–1.2)
BUN SERPL-MCNC: 12 MG/DL (ref 8–23)
BUN/CREAT SERPL: 13 (ref 9–20)
CALCIUM SERPL-MCNC: 9.6 MG/DL (ref 8.6–10.4)
CHLORIDE SERPL-SCNC: 98 MMOL/L (ref 98–107)
CHOLEST SERPL-MCNC: 132 MG/DL (ref 0–199)
CHOLESTEROL/HDL RATIO: 3
CO2 SERPL-SCNC: 29 MMOL/L (ref 20–31)
CREAT SERPL-MCNC: 0.9 MG/DL (ref 0.7–1.2)
GFR SERPL CREATININE-BSD FRML MDRD: >90 ML/MIN/1.73M2
GLUCOSE SERPL-MCNC: 102 MG/DL (ref 70–99)
HDLC SERPL-MCNC: 38 MG/DL
LDLC SERPL CALC-MCNC: 48 MG/DL (ref 0–100)
POTASSIUM SERPL-SCNC: 3.5 MMOL/L (ref 3.7–5.3)
PROT SERPL-MCNC: 7.1 G/DL (ref 6.4–8.3)
SODIUM SERPL-SCNC: 136 MMOL/L (ref 135–144)
TRIGL SERPL-MCNC: 227 MG/DL
VLDLC SERPL CALC-MCNC: 45 MG/DL

## 2024-03-29 PROCEDURE — 80061 LIPID PANEL: CPT

## 2024-03-29 PROCEDURE — 80053 COMPREHEN METABOLIC PANEL: CPT

## 2024-03-29 PROCEDURE — 36415 COLL VENOUS BLD VENIPUNCTURE: CPT

## 2024-04-20 SDOH — ECONOMIC STABILITY: FOOD INSECURITY: WITHIN THE PAST 12 MONTHS, YOU WORRIED THAT YOUR FOOD WOULD RUN OUT BEFORE YOU GOT MONEY TO BUY MORE.: NEVER TRUE

## 2024-04-20 SDOH — ECONOMIC STABILITY: FOOD INSECURITY: WITHIN THE PAST 12 MONTHS, THE FOOD YOU BOUGHT JUST DIDN'T LAST AND YOU DIDN'T HAVE MONEY TO GET MORE.: NEVER TRUE

## 2024-04-20 SDOH — ECONOMIC STABILITY: HOUSING INSECURITY
IN THE LAST 12 MONTHS, WAS THERE A TIME WHEN YOU DID NOT HAVE A STEADY PLACE TO SLEEP OR SLEPT IN A SHELTER (INCLUDING NOW)?: NO

## 2024-04-20 SDOH — ECONOMIC STABILITY: INCOME INSECURITY: HOW HARD IS IT FOR YOU TO PAY FOR THE VERY BASICS LIKE FOOD, HOUSING, MEDICAL CARE, AND HEATING?: NOT HARD AT ALL

## 2024-04-22 ASSESSMENT — PATIENT HEALTH QUESTIONNAIRE - PHQ9
SUM OF ALL RESPONSES TO PHQ QUESTIONS 1-9: 0
2. FEELING DOWN, DEPRESSED OR HOPELESS: NOT AT ALL
SUM OF ALL RESPONSES TO PHQ9 QUESTIONS 1 & 2: 0
SUM OF ALL RESPONSES TO PHQ QUESTIONS 1-9: 0
SUM OF ALL RESPONSES TO PHQ9 QUESTIONS 1 & 2: 0
2. FEELING DOWN, DEPRESSED OR HOPELESS: NOT AT ALL
1. LITTLE INTEREST OR PLEASURE IN DOING THINGS: NOT AT ALL
SUM OF ALL RESPONSES TO PHQ QUESTIONS 1-9: 0
SUM OF ALL RESPONSES TO PHQ QUESTIONS 1-9: 0
1. LITTLE INTEREST OR PLEASURE IN DOING THINGS: NOT AT ALL

## 2024-04-23 ENCOUNTER — OFFICE VISIT (OUTPATIENT)
Dept: FAMILY MEDICINE CLINIC | Age: 59
End: 2024-04-23
Payer: COMMERCIAL

## 2024-04-23 VITALS
DIASTOLIC BLOOD PRESSURE: 86 MMHG | SYSTOLIC BLOOD PRESSURE: 139 MMHG | HEART RATE: 64 BPM | HEIGHT: 68 IN | BODY MASS INDEX: 30.46 KG/M2 | WEIGHT: 201 LBS

## 2024-04-23 DIAGNOSIS — I10 ESSENTIAL HYPERTENSION: Primary | ICD-10-CM

## 2024-04-23 DIAGNOSIS — Z12.5 PROSTATE CANCER SCREENING: ICD-10-CM

## 2024-04-23 DIAGNOSIS — L40.9 PSORIASIS: ICD-10-CM

## 2024-04-23 DIAGNOSIS — F41.8 SITUATIONAL ANXIETY: ICD-10-CM

## 2024-04-23 DIAGNOSIS — R73.9 HYPERGLYCEMIA: ICD-10-CM

## 2024-04-23 DIAGNOSIS — E78.2 MIXED HYPERLIPIDEMIA: ICD-10-CM

## 2024-04-23 PROCEDURE — G8427 DOCREV CUR MEDS BY ELIG CLIN: HCPCS | Performed by: INTERNAL MEDICINE

## 2024-04-23 PROCEDURE — 3075F SYST BP GE 130 - 139MM HG: CPT | Performed by: INTERNAL MEDICINE

## 2024-04-23 PROCEDURE — 1036F TOBACCO NON-USER: CPT | Performed by: INTERNAL MEDICINE

## 2024-04-23 PROCEDURE — 3017F COLORECTAL CA SCREEN DOC REV: CPT | Performed by: INTERNAL MEDICINE

## 2024-04-23 PROCEDURE — 99214 OFFICE O/P EST MOD 30 MIN: CPT | Performed by: INTERNAL MEDICINE

## 2024-04-23 PROCEDURE — 3079F DIAST BP 80-89 MM HG: CPT | Performed by: INTERNAL MEDICINE

## 2024-04-23 PROCEDURE — G8417 CALC BMI ABV UP PARAM F/U: HCPCS | Performed by: INTERNAL MEDICINE

## 2024-04-23 RX ORDER — ALPRAZOLAM 0.5 MG/1
0.5 TABLET ORAL DAILY
Qty: 2 TABLET | Refills: 0 | Status: SHIPPED | OUTPATIENT
Start: 2024-04-23 | End: 2024-04-25

## 2024-04-23 RX ORDER — DIFLORASONE DIACETATE 0.5 MG/G
CREAM TOPICAL
Qty: 15 G | Refills: 1 | Status: SHIPPED | OUTPATIENT
Start: 2024-04-23

## 2024-04-23 ASSESSMENT — ENCOUNTER SYMPTOMS
NAUSEA: 0
ABDOMINAL PAIN: 0
CONSTIPATION: 0
BLOOD IN STOOL: 0
DIARRHEA: 0
SORE THROAT: 0
RESPIRATORY NEGATIVE: 1

## 2024-04-23 NOTE — PROGRESS NOTES
congestion, ear pain, postnasal drip, sneezing and sore throat.    Eyes:  Negative for visual disturbance.   Respiratory: Negative.     Cardiovascular:  Negative for chest pain, palpitations and leg swelling.   Gastrointestinal:  Negative for abdominal pain, blood in stool, constipation, diarrhea and nausea.   Genitourinary:  Negative for difficulty urinating, dysuria, frequency and urgency.   Musculoskeletal:  Negative for arthralgias, joint swelling, myalgias, neck pain and neck stiffness.   Skin: Negative.    Neurological:  Negative for syncope.   Psychiatric/Behavioral: Negative.            Objective   Physical Exam  Vitals and nursing note reviewed.   Constitutional:       Appearance: He is well-developed.   HENT:      Head: Atraumatic.   Eyes:      Conjunctiva/sclera: Conjunctivae normal.   Cardiovascular:      Rate and Rhythm: Normal rate and regular rhythm.      Heart sounds: Normal heart sounds.   Pulmonary:      Effort: Pulmonary effort is normal.      Breath sounds: Normal breath sounds.   Abdominal:      Palpations: Abdomen is soft.      Tenderness: There is no abdominal tenderness.   Musculoskeletal:      Cervical back: Normal range of motion and neck supple.   Lymphadenopathy:      Cervical: No cervical adenopathy.   Skin:     Findings: No rash.   Neurological:      Mental Status: He is alert.   Psychiatric:         Behavior: Behavior normal.         Thought Content: Thought content normal.                  An electronic signature was used to authenticate this note.    --Suhail Freeman MD

## 2024-04-23 NOTE — PATIENT INSTRUCTIONS
Survey:     You may be receiving a survey from [employer] regarding your visit today.     You may get this in the mail, through your MyChart or in your email.      Please complete the survey to enable us to provide the highest quality of care to you and your family. Please also, mention our names.     If you cannot score us as very good (5 Stars) on any question, please feel free to call the office to discuss how we could have made your experience exceptional.      Thank You!        Dr. Freeman, MD Felix, CANDI Goyal, SKYLER Farah CNP, CECILE Ramos MA       Plan:  1. Essential hypertension  Controlled on Lotensin HCT, Lopressor, and Amlodipine.  No change in medication.  - Comprehensive Metabolic Panel; Future    2. Situational anxiety  Take a Xanax 20 minutes before flight.    - ALPRAZolam (XANAX) 0.5 MG tablet; Take 1 tablet by mouth daily for 2 days. Max Daily Amount: 0.5 mg  Dispense: 2 tablet; Refill: 0    3. Psoriasis  No improvement on Kenalog.  Try Psorcon 0.05% cream two times a day until resolved.  - diflorasone (PSORCON) 0.05 % cream; Apply to the affected area 2 times a day.  Dispense: 15 g; Refill: 1    4. Hyperglycemia  HgbA1C in 6 months.    - Comprehensive Metabolic Panel; Future  - Hemoglobin A1C; Future    5. Prostate cancer screening  PSA with next labs.  - PSA Screening; Future    6. Mixed hyperlipidemia  Controlled on Crestor.  No change in medication.  Obtain labs approximately one week prior to the office appointment.  Please fast for 12 hours prior to obtaining the labs.  Water or black coffee (no cream or sugar) is allowed prior to the the labs.    - Comprehensive Metabolic Panel; Future  - Lipid Panel; Future    Joselito was instructed to follow up in the clinic in 6 months for check up or as needed with any medical issues.

## 2024-04-29 DIAGNOSIS — L40.9 PSORIASIS: Primary | ICD-10-CM

## 2024-04-29 RX ORDER — BETAMETHASONE DIPROPIONATE 0.5 MG/G
CREAM TOPICAL
Qty: 1 EACH | Refills: 2 | Status: SHIPPED | OUTPATIENT
Start: 2024-04-29 | End: 2024-05-29

## 2024-05-03 DIAGNOSIS — M62.830 BACK SPASM: ICD-10-CM

## 2024-05-03 RX ORDER — METHOCARBAMOL 750 MG/1
TABLET, FILM COATED ORAL
Qty: 90 TABLET | Refills: 1 | Status: SHIPPED | OUTPATIENT
Start: 2024-05-03

## 2024-05-13 DIAGNOSIS — E78.2 MIXED HYPERCHOLESTEROLEMIA AND HYPERTRIGLYCERIDEMIA: ICD-10-CM

## 2024-05-13 DIAGNOSIS — I10 ESSENTIAL HYPERTENSION: ICD-10-CM

## 2024-05-13 RX ORDER — ROSUVASTATIN CALCIUM 10 MG/1
10 TABLET, COATED ORAL NIGHTLY
Qty: 90 TABLET | Refills: 3 | Status: SHIPPED | OUTPATIENT
Start: 2024-05-13

## 2024-05-13 RX ORDER — AMLODIPINE BESYLATE 5 MG/1
5 TABLET ORAL DAILY
Qty: 90 TABLET | Refills: 3 | Status: SHIPPED | OUTPATIENT
Start: 2024-05-13

## 2024-05-13 NOTE — TELEPHONE ENCOUNTER
Last OV 4/23/24 chronics     Next OV 10/15/24    Requesting refills on rosuvastatin, metoprolol, and amlodipine thru [facility].  Rx's pending

## 2024-08-02 ENCOUNTER — TELEPHONE (OUTPATIENT)
Dept: FAMILY MEDICINE CLINIC | Age: 59
End: 2024-08-02

## 2024-08-02 ENCOUNTER — HOSPITAL ENCOUNTER (EMERGENCY)
Age: 59
Discharge: HOME | End: 2024-08-02
Attending: EMERGENCY MEDICINE
Payer: COMMERCIAL

## 2024-08-02 VITALS
OXYGEN SATURATION: 96 % | HEART RATE: 95 BPM | BODY MASS INDEX: 29.05 KG/M2 | RESPIRATION RATE: 17 BRPM | DIASTOLIC BLOOD PRESSURE: 89 MMHG | WEIGHT: 191.7 LBS | TEMPERATURE: 98.1 F | HEIGHT: 68 IN | SYSTOLIC BLOOD PRESSURE: 139 MMHG

## 2024-08-02 DIAGNOSIS — M54.41 ACUTE RIGHT-SIDED LOW BACK PAIN WITH RIGHT-SIDED SCIATICA: Primary | ICD-10-CM

## 2024-08-02 PROCEDURE — 6360000002 HC RX W HCPCS: Performed by: EMERGENCY MEDICINE

## 2024-08-02 PROCEDURE — 6370000000 HC RX 637 (ALT 250 FOR IP): Performed by: EMERGENCY MEDICINE

## 2024-08-02 PROCEDURE — 99284 EMERGENCY DEPT VISIT MOD MDM: CPT

## 2024-08-02 PROCEDURE — 96372 THER/PROPH/DIAG INJ SC/IM: CPT

## 2024-08-02 RX ORDER — METHYLPREDNISOLONE 4 MG/1
TABLET ORAL
Qty: 1 KIT | Refills: 0 | Status: SHIPPED | OUTPATIENT
Start: 2024-08-02 | End: 2024-08-08

## 2024-08-02 RX ORDER — KETOROLAC TROMETHAMINE 30 MG/ML
30 INJECTION, SOLUTION INTRAMUSCULAR; INTRAVENOUS ONCE
Status: COMPLETED | OUTPATIENT
Start: 2024-08-02 | End: 2024-08-02

## 2024-08-02 RX ORDER — AMOXICILLIN 500 MG
CAPSULE ORAL
COMMUNITY

## 2024-08-02 RX ORDER — LIDOCAINE 4 G/G
1 PATCH TOPICAL ONCE
Status: DISCONTINUED | OUTPATIENT
Start: 2024-08-02 | End: 2024-08-02 | Stop reason: HOSPADM

## 2024-08-02 RX ORDER — LIDOCAINE 50 MG/G
1 PATCH TOPICAL EVERY 24 HOURS
Qty: 30 PATCH | Refills: 0 | Status: SHIPPED | OUTPATIENT
Start: 2024-08-02 | End: 2024-09-01

## 2024-08-02 RX ORDER — LIDOCAINE 4 G/G
1 PATCH TOPICAL DAILY
Status: DISCONTINUED | OUTPATIENT
Start: 2024-08-02 | End: 2024-08-02

## 2024-08-02 RX ORDER — IBUPROFEN 600 MG/1
600 TABLET ORAL EVERY 6 HOURS PRN
Qty: 28 TABLET | Refills: 0 | Status: SHIPPED | OUTPATIENT
Start: 2024-08-02 | End: 2024-08-09

## 2024-08-02 RX ADMIN — KETOROLAC TROMETHAMINE 30 MG: 30 INJECTION, SOLUTION INTRAMUSCULAR at 06:54

## 2024-08-02 ASSESSMENT — ENCOUNTER SYMPTOMS
WHEEZING: 0
FACIAL SWELLING: 0
BLOOD IN STOOL: 0
SORE THROAT: 0
DIARRHEA: 0
CHEST TIGHTNESS: 0
ABDOMINAL PAIN: 0
NAUSEA: 0
EYE REDNESS: 0
VOMITING: 0
CONSTIPATION: 0
TROUBLE SWALLOWING: 0
SHORTNESS OF BREATH: 0
COLOR CHANGE: 0
COUGH: 0
BACK PAIN: 1
EYE PAIN: 0
RHINORRHEA: 0
EYE DISCHARGE: 0
SINUS PRESSURE: 0

## 2024-08-02 ASSESSMENT — PAIN DESCRIPTION - LOCATION: LOCATION: BACK

## 2024-08-02 ASSESSMENT — PAIN - FUNCTIONAL ASSESSMENT: PAIN_FUNCTIONAL_ASSESSMENT: 0-10

## 2024-08-02 ASSESSMENT — LIFESTYLE VARIABLES
HOW OFTEN DO YOU HAVE A DRINK CONTAINING ALCOHOL: 4 OR MORE TIMES A WEEK
HOW MANY STANDARD DRINKS CONTAINING ALCOHOL DO YOU HAVE ON A TYPICAL DAY: 3 OR 4

## 2024-08-02 ASSESSMENT — PAIN DESCRIPTION - PAIN TYPE: TYPE: ACUTE PAIN

## 2024-08-02 ASSESSMENT — PAIN SCALES - GENERAL: PAINLEVEL_OUTOF10: 10

## 2024-08-02 ASSESSMENT — PAIN DESCRIPTION - FREQUENCY: FREQUENCY: CONTINUOUS

## 2024-08-02 ASSESSMENT — PAIN DESCRIPTION - ORIENTATION: ORIENTATION: LEFT;LOWER

## 2024-08-02 ASSESSMENT — PAIN DESCRIPTION - DESCRIPTORS: DESCRIPTORS: ACHING

## 2024-08-02 NOTE — ED PROVIDER NOTES
tablet     Sig: Take 1 tablet by mouth every 6 hours as needed for Pain     Dispense:  28 tablet     Refill:  0   • lidocaine (LIDODERM) 5 %     Sig: Place 1 patch onto the skin every 24 hours Place 1 patch onto the skin daily 12 hours on, 12 hours off.     Dispense:  30 patch     Refill:  0   • methylPREDNISolone (MEDROL DOSEPACK) 4 MG tablet     Sig: Take by mouth.     Dispense:  1 kit     Refill:  0     DISCHARGE PRESCRIPTIONS:  New Prescriptions    IBUPROFEN (ADVIL;MOTRIN) 600 MG TABLET    Take 1 tablet by mouth every 6 hours as needed for Pain    LIDOCAINE (LIDODERM) 5 %    Place 1 patch onto the skin every 24 hours Place 1 patch onto the skin daily 12 hours on, 12 hours off.    METHYLPREDNISOLONE (MEDROL DOSEPACK) 4 MG TABLET    Take by mouth.     PHYSICIAN CONSULTS ORDERED THIS ENCOUNTER:  None    FINAL IMPRESSION      1. Acute right-sided low back pain with right-sided sciatica          DISPOSITION/PLAN   DISPOSITION Decision To Discharge 08/02/2024 06:50:06 AM      PATIENT REFERREDTO:  Suhail Freeman MD  [address] [place] [address]  [phone]    Schedule an appointment as soon as possible for a visit in 3 days  Follow up within 3 days, Return to ED sooner if symptoms worsen      DISCHARGEMEDICATIONS:  New Prescriptions    IBUPROFEN (ADVIL;MOTRIN) 600 MG TABLET    Take 1 tablet by mouth every 6 hours as needed for Pain    LIDOCAINE (LIDODERM) 5 %    Place 1 patch onto the skin every 24 hours Place 1 patch onto the skin daily 12 hours on, 12 hours off.    METHYLPREDNISOLONE (MEDROL DOSEPACK) 4 MG TABLET    Take by mouth.       (Please note that portions of this note were completed with a voice recognition program.  Efforts were made to edit thedictations but occasionally words are mis-transcribed.)    Fracisco Sage MD  Attending Emergency Physician                        Fracisco Sage MD  08/02/24 4210

## 2024-08-02 NOTE — TELEPHONE ENCOUNTER
[facility] ED Follow up Call    Reason for ED visit:  Back Pain     8/2/2024     Shawn Yee , this is Bonnie from Suhail Lopez's office, just calling to see how you are doing after your recent ED visit.    Did you receive discharge instructions?  Yes  Do you understand the discharge instructions? Yes  Did the ED give you any new prescriptions? Yes  Were you able to fill your prescriptions? Yes      Do you have one of our red, yellow and green  Zone sheets that help you to determine when you should go to the ED?  Not Applicable      Do you need or want to make a follow up appt with your PCP?  No    Do you have any further needs in the home, e.g. equipment?  No        FU appts/Provider:    Future Appointments   Date Time Provider Department Center   10/15/2024  3:15 PM Suhail Freeman MD [facility] ECC DEP

## 2024-10-02 ENCOUNTER — HOSPITAL ENCOUNTER (OUTPATIENT)
Dept: PHYSICAL THERAPY | Age: 59
Setting detail: THERAPIES SERIES
Discharge: HOME | End: 2024-10-02
Payer: COMMERCIAL

## 2024-10-02 PROCEDURE — 97161 PT EVAL LOW COMPLEX 20 MIN: CPT

## 2024-10-02 PROCEDURE — 97110 THERAPEUTIC EXERCISES: CPT

## 2024-10-02 ASSESSMENT — PAIN SCALES - GENERAL: PAINLEVEL_OUTOF10: 3

## 2024-10-02 NOTE — PLAN OF CARE
[facility]       Phone: [phone]   Date: 10/2/2024                      Outpatient Physical Therapy  Fax: [phone]    ACCT #: [id number]                     Plan of Care  [facility]#: [id number]  Patient: Joselito Mendez  : 1965    Referring Provider (secondary): Neli Pearson    Diagnosis: S39.012A (ICD-10-CM) - Strain of muscle, fascia and tendon of lower back, initial encounter  Onset Date: 24  Treatment Diagnosis: S39.012A (ICD-10-CM) - Strain of muscle, fascia and tendon of lower back, initial encounter    Assessment  Body Structures, Functions, Activity Limitations Requiring Skilled Therapeutic Intervention: Decreased functional mobility , Decreased ADL status, Decreased ROM, Decreased body mechanics, Decreased tolerance to work activity, Decreased strength, Increased pain, Decreased posture  Assessment: Patient presents with c/o right lower back pain with occasional referral to right anterior thigh.  Unable to change symptoms with Candelaria testing.  Tightness in lumbar paraspinals and joint mobility.  Likely his symptoms are muscular in origin.  He would benefit from skilled PT to improve lumbar ROM and flexibility, core strength and for manual techniques to reduce muscular tightness.  Recommend dry needling to expidite progress if covered.  Therapy Prognosis: Excellent    Treatment Plan   Days: 2 times per week Weeks: 6 weeks Total # of Visits Approved: 12    Patient Education/HEP, Back Education, Therapeutic Exercise, Manual Therapy: Myofacial Release/Cupping, Manual Therapy: Mobilization/Manipulation, Neuro Re-ed, Dry Needling, HP/CP, Electrical Stimulation, and Therapeutic Activity     Goals  Short Term Goals  Time Frame for Short Term Goals: 6 visits  Short Term Goal 1: Patient will demonstrate compliance with HEP to optimize therapy progress  Long Term Goals  Time Frame for Long Term Goals : 12 visits  Long Term Goal 1: Patient will improve lumbar AROM to nil limitation in

## 2024-10-02 NOTE — THERAPY EVALUATION
Phone: [phone]                       [facility]         Fax: [phone]                      Outpatient Physical Therapy                                                                      Evaluation    Date: 10/2/2024  Patient: Joselito Mendez  : 1965  ACCT #: [id number]    Referring Provider (secondary): Neli Pearson    Diagnosis: S39.012A (ICD-10-CM) - Strain of muscle, fascia and tendon of lower back, initial encounter    Treatment Diagnosis: S39.012A (ICD-10-CM) - Strain of muscle, fascia and tendon of lower back, initial encounter  Onset Date: 24  PT Insurance Information: [facility]  Total # of Visits Approved: 12 Per Physician Order  Total # of Visits to Date: 1     Subjective     Additional Pertinent Hx: 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 Toward the end of the day it really hurt.\"  Felt better then next day then came back.  Points to right lower back and into anterior thigh to knee. Denies T/N.  Typically only drives tow motor occasionally.  Pain continues to come and go.  Still has difficulty bending over and lifting or twisting.  Pain increases with sitting over 45 minutes.  Standing tolerance also about 45'.  Better when walking. He has intermittent sleeping difficulty depending on his activity level the day before.  Stiff in mornings.  Started prednisone yesterday. Denies pain with cough/sneeze. He continues to work full duty and is doing OK with this.  PMHx: HTN, HLD  Pain Assessment  Pain Level: 3          Objective        Spine  Lumbar: Flexion: 50% limited.  Extension: 50% limited, Rotation right 50% limited, left 25% limited.  SB: right nil limitation, 50% limited  Special Tests: (+) SLR right.  Candelaria pre-test pain 3/10.  RFIL: Increase, no worse.  REIL:  Strength RLE  R Hip Flexion: 4/5  R Knee Flexion: 5/5  R Knee Extension: 4/5  R Ankle Dorsiflexion: 5/5  R Ankle Plantar flexion:

## 2024-10-08 ENCOUNTER — HOSPITAL ENCOUNTER (OUTPATIENT)
Dept: PHYSICAL THERAPY | Age: 59
Setting detail: THERAPIES SERIES
Discharge: HOME | End: 2024-10-08
Payer: COMMERCIAL

## 2024-10-08 NOTE — PROGRESS NOTES
Physical Therapy  [facility]  Rehab and Wellness    Date: 10/8/2024  Patient Name: Joselito Mendez        : 1965       Called and cancelled his appointment, could possibly have to do with something he received from  they are denying the claim.      Iram MACIAS Shock Date: 10/8/2024

## 2024-10-10 ENCOUNTER — HOSPITAL ENCOUNTER (OUTPATIENT)
Dept: PHYSICAL THERAPY | Age: 59
Setting detail: THERAPIES SERIES
Discharge: HOME | End: 2024-10-10
Payer: COMMERCIAL

## 2024-10-10 PROCEDURE — 97140 MANUAL THERAPY 1/> REGIONS: CPT

## 2024-10-10 PROCEDURE — 97110 THERAPEUTIC EXERCISES: CPT

## 2024-10-10 NOTE — PROGRESS NOTES
will improve hip flexibility to min limitation to allow improved mobility  Long Term Goal 3: Patient will report working full day with little to no pain  Long Term Goal 4: Patient will improve Oswestry score from 21/50 to 10/50 to demonstrate functional improvement    Post Treatment Pain:  1/10    Time In: 1512    Time Out : 1549        Timed Code Treatment Minutes: 41 Minutes  Total Treatment Time: 41 Minutes    Time In/Out Each Unit billed  Therapeutic Exercise:  6009-8187  Manual Therapy:  0655-2823          Char Ibarra,ANJANA     Date: 10/10/2024

## 2024-10-11 ENCOUNTER — HOSPITAL ENCOUNTER (OUTPATIENT)
Age: 59
Discharge: HOME | End: 2024-10-11
Payer: COMMERCIAL

## 2024-10-11 DIAGNOSIS — I10 ESSENTIAL HYPERTENSION: ICD-10-CM

## 2024-10-11 DIAGNOSIS — R73.9 HYPERGLYCEMIA: ICD-10-CM

## 2024-10-11 DIAGNOSIS — Z12.5 PROSTATE CANCER SCREENING: ICD-10-CM

## 2024-10-11 DIAGNOSIS — E78.2 MIXED HYPERLIPIDEMIA: ICD-10-CM

## 2024-10-11 LAB
ALBUMIN SERPL-MCNC: 4.3 G/DL (ref 3.5–5.2)
ALP SERPL-CCNC: 56 U/L (ref 40–129)
ALT SERPL-CCNC: 59 U/L (ref 5–41)
ANION GAP SERPL CALCULATED.3IONS-SCNC: 8 MMOL/L (ref 9–17)
AST SERPL-CCNC: 49 U/L
BILIRUB SERPL-MCNC: 1.1 MG/DL (ref 0.3–1.2)
BUN SERPL-MCNC: 12 MG/DL (ref 8–23)
BUN/CREAT SERPL: 17 (ref 9–20)
CALCIUM SERPL-MCNC: 9.4 MG/DL (ref 8.6–10.4)
CHLORIDE SERPL-SCNC: 99 MMOL/L (ref 98–107)
CHOLEST SERPL-MCNC: 149 MG/DL (ref 0–199)
CHOLESTEROL/HDL RATIO: 4
CO2 SERPL-SCNC: 31 MMOL/L (ref 20–31)
CREAT SERPL-MCNC: 0.7 MG/DL (ref 0.7–1.2)
EST. AVERAGE GLUCOSE BLD GHB EST-MCNC: 111 MG/DL
GFR, ESTIMATED: >90 ML/MIN/1.73M2
GLUCOSE SERPL-MCNC: 116 MG/DL (ref 70–99)
HBA1C MFR BLD: 5.5 % (ref 4–6)
HDLC SERPL-MCNC: 38 MG/DL
LDLC SERPL CALC-MCNC: 63 MG/DL (ref 0–100)
POTASSIUM SERPL-SCNC: 3.6 MMOL/L (ref 3.7–5.3)
PROT SERPL-MCNC: 7.2 G/DL (ref 6.4–8.3)
PSA SERPL-MCNC: 0.9 NG/ML (ref 0–4)
SODIUM SERPL-SCNC: 138 MMOL/L (ref 135–144)
TRIGL SERPL-MCNC: 243 MG/DL
VLDLC SERPL CALC-MCNC: 49 MG/DL

## 2024-10-11 PROCEDURE — 80061 LIPID PANEL: CPT

## 2024-10-11 PROCEDURE — 80053 COMPREHEN METABOLIC PANEL: CPT

## 2024-10-11 PROCEDURE — 36415 COLL VENOUS BLD VENIPUNCTURE: CPT

## 2024-10-11 PROCEDURE — G0103 PSA SCREENING: HCPCS

## 2024-10-11 PROCEDURE — 83036 HEMOGLOBIN GLYCOSYLATED A1C: CPT

## 2024-10-14 ENCOUNTER — HOSPITAL ENCOUNTER (OUTPATIENT)
Dept: PHYSICAL THERAPY | Age: 59
Setting detail: THERAPIES SERIES
Discharge: HOME | End: 2024-10-14
Payer: COMMERCIAL

## 2024-10-14 PROCEDURE — 97110 THERAPEUTIC EXERCISES: CPT

## 2024-10-14 PROCEDURE — 97140 MANUAL THERAPY 1/> REGIONS: CPT

## 2024-10-14 ASSESSMENT — PAIN SCALES - GENERAL: PAINLEVEL_OUTOF10: 3

## 2024-10-14 NOTE — PROGRESS NOTES
Phone: [phone]                 [facility]      Fax: [phone]                            Outpatient Physical Therapy                                                                            Daily Note    Date: 10/14/2024  Patient Name: Joselito Mendez        MRN: [id number]   ACCT#:  [id number]  : 1965  (58 y.o.)    Referring Provider (secondary): Neli Pearson         Diagnosis: S39.012A (ICD-10-CM) - Strain of muscle, fascia and tendon of lower back, initial encounter  Treatment Diagnosis: S39.012A (ICD-10-CM) - Strain of muscle, fascia and tendon of lower back, initial encounter    Onset Date: 24  PT Insurance Information: [facility]  Total # of Visits Approved: 12 Per Physician Order  Total # of Visits to Date: 3  Plan of Care/Certification Expiration Date: 10/11/24    Pre-Treatment Pain:  2-3/10     Assessment  Assessment: Patient states back pain is a 2-3/10 this afternoon. Patient reports no increased pain over the weekned. Continued with strengthening exercises to improve posture and hip strength and manual to reduce pain. Following last session patient notes he was able to get relief until the next morning when he woke up with some stiffness. Following today's session patient notes back pain remains a 2-3/10.    Plan  Continue with current plan of care    Exercises/Modalities/Manual:  See DocFlow Sheet    Education: HEP     Goals  (Total # of Visits to Date: 3)   Short Term Goals  Time Frame for Short Term Goals: 6 visits  Short Term Goal 1: Patient will demonstrate compliance with HEP to optimize therapy progress    Long Term Goals  Time Frame for Long Term Goals : 12 visits  Long Term Goal 1: Patient will improve lumbar AROM to nil limitation in all directions to allow normal ADLs  Long Term Goal 2: Patient will improve hip flexibility to min limitation to allow improved mobility  Long Term Goal 3: Patient will report working full day with little to no pain  Long Term Goal 4:

## 2024-10-15 ENCOUNTER — OFFICE VISIT (OUTPATIENT)
Dept: FAMILY MEDICINE CLINIC | Age: 59
End: 2024-10-15
Payer: COMMERCIAL

## 2024-10-15 VITALS
BODY MASS INDEX: 30.16 KG/M2 | DIASTOLIC BLOOD PRESSURE: 84 MMHG | WEIGHT: 199 LBS | SYSTOLIC BLOOD PRESSURE: 132 MMHG | HEIGHT: 68 IN | HEART RATE: 74 BPM

## 2024-10-15 DIAGNOSIS — Z23 NEED FOR INFLUENZA VACCINATION: ICD-10-CM

## 2024-10-15 DIAGNOSIS — R73.01 IFG (IMPAIRED FASTING GLUCOSE): ICD-10-CM

## 2024-10-15 DIAGNOSIS — E78.2 MIXED HYPERLIPIDEMIA: ICD-10-CM

## 2024-10-15 DIAGNOSIS — I10 ESSENTIAL HYPERTENSION: Primary | ICD-10-CM

## 2024-10-15 DIAGNOSIS — F41.8 SITUATIONAL ANXIETY: ICD-10-CM

## 2024-10-15 PROCEDURE — G8417 CALC BMI ABV UP PARAM F/U: HCPCS | Performed by: INTERNAL MEDICINE

## 2024-10-15 PROCEDURE — 90471 IMMUNIZATION ADMIN: CPT | Performed by: INTERNAL MEDICINE

## 2024-10-15 PROCEDURE — 1036F TOBACCO NON-USER: CPT | Performed by: INTERNAL MEDICINE

## 2024-10-15 PROCEDURE — G8427 DOCREV CUR MEDS BY ELIG CLIN: HCPCS | Performed by: INTERNAL MEDICINE

## 2024-10-15 PROCEDURE — G8484 FLU IMMUNIZE NO ADMIN: HCPCS | Performed by: INTERNAL MEDICINE

## 2024-10-15 PROCEDURE — 3075F SYST BP GE 130 - 139MM HG: CPT | Performed by: INTERNAL MEDICINE

## 2024-10-15 PROCEDURE — 3017F COLORECTAL CA SCREEN DOC REV: CPT | Performed by: INTERNAL MEDICINE

## 2024-10-15 PROCEDURE — 3079F DIAST BP 80-89 MM HG: CPT | Performed by: INTERNAL MEDICINE

## 2024-10-15 PROCEDURE — 99214 OFFICE O/P EST MOD 30 MIN: CPT | Performed by: INTERNAL MEDICINE

## 2024-10-15 PROCEDURE — 90661 CCIIV3 VAC ABX FR 0.5 ML IM: CPT | Performed by: INTERNAL MEDICINE

## 2024-10-15 RX ORDER — ALPRAZOLAM 0.5 MG
0.5 TABLET ORAL DAILY
Qty: 2 TABLET | Refills: 0 | Status: SHIPPED | OUTPATIENT
Start: 2024-10-15 | End: 2024-10-17

## 2024-10-15 ASSESSMENT — ENCOUNTER SYMPTOMS
SORE THROAT: 0
CONSTIPATION: 0
RESPIRATORY NEGATIVE: 1
ABDOMINAL PAIN: 0
BLOOD IN STOOL: 0
SHORTNESS OF BREATH: 0
DIARRHEA: 0
NAUSEA: 0

## 2024-10-15 NOTE — ASSESSMENT & PLAN NOTE
Controlled on Amlodipine and Lopressor.  No change in medication at this time.    Orders:    Comprehensive Metabolic Panel; Future

## 2024-10-15 NOTE — PROGRESS NOTES
Joselito Mendez (:  1965) is a 58 y.o. male,Established patient, here for evaluation of the following chief complaint(s):  Hypertension (6 month check up. Discuss labs. ), Hyperlipidemia, and Psoriasis (Changed kenalog cr to Psorcon cr)         Assessment & Plan  Essential hypertension   Controlled on Amlodipine and Lopressor.  No change in medication at this time.    Orders:    Comprehensive Metabolic Panel; Future    Need for influenza vaccination   Joselito was given an influenza vaccine today.      Orders:    Influenza, FLUCELVAX Trivalent, (age 6 mo+) IM, Preservative Free, 0.5mL    Situational anxiety   Anxiety with flying.  Will prescribe xanax 0.5 mg before the flight.    Orders:    ALPRAZolam (XANAX) 0.5 MG tablet; Take 1 tablet by mouth daily for 2 days. Max Daily Amount: 0.5 mg    Mixed hyperlipidemia   Controlled on Crestor.  No change in medication.  Obtain labs approximately one week prior to the office appointment.  Please fast for 12 hours prior to obtaining the labs.  Water or black coffee (no cream or sugar) is allowed prior to the the labs.      Orders:    Comprehensive Metabolic Panel; Future    Lipid Panel; Future    IFG (impaired fasting glucose)   HgbA1C has been running < 6.   HgbA1C in 6 months.      Orders:    Comprehensive Metabolic Panel; Future    Hemoglobin A1C; Future    Joselito was instructed to follow up in the clinic in 6 months for check up or as needed with any medical issues.                   Subjective   Joselito presents for a check up on his medical conditions HTN, Hyperlipidemia, Psoriasis.  Joselito denies new problems.  Medications were reviewed with Joselito, he is  tolerating the medication.  Bowels are regular.  There has not been rectal bleeding.  Joselito denies urinary complications, the urine stream is good.  Joselito denies chest pain and denies increasing shortness of breath.  Labs from 10/24 reviewed.      Past Medical History:  2020: Elevated LFTs

## 2024-10-15 NOTE — PATIENT INSTRUCTIONS
Survey:     You may be receiving a survey from [employer] regarding your visit today.     You may get this in the mail, through your MyChart or in your email.      Please complete the survey to enable us to provide the highest quality of care to you and your family. Please also, mention our names.     If you cannot score us as very good (5 Stars) on any question, please feel free to call the office to discuss how we could have made your experience exceptional.      Thank You!        Dr. Freeman, MD Felix, CANDI Goyal, CECILE Lopez, APRN DUANE Nicole, CECILE Ramos, CMA          Assessment & Plan  Essential hypertension   Controlled on Amlodipine and Lopressor.  No change in medication at this time.    Orders:    Comprehensive Metabolic Panel; Future    Need for influenza vaccination   Joselito was given an influenza vaccine today.      Orders:    Influenza, FLUCELVAX Trivalent, (age 6 mo+) IM, Preservative Free, 0.5mL    Situational anxiety   Anxiety with flying.  Will prescribe xanax 0.5 mg before the flight.    Orders:    ALPRAZolam (XANAX) 0.5 MG tablet; Take 1 tablet by mouth daily for 2 days. Max Daily Amount: 0.5 mg    Mixed hyperlipidemia   Controlled on Crestor.  No change in medication.  Obtain labs approximately one week prior to the office appointment.  Please fast for 12 hours prior to obtaining the labs.  Water or black coffee (no cream or sugar) is allowed prior to the the labs.      Orders:    Comprehensive Metabolic Panel; Future    Lipid Panel; Future    IFG (impaired fasting glucose)   HgbA1C has been running < 6.   HgbA1C in 6 months.      Orders:    Comprehensive Metabolic Panel; Future    Hemoglobin A1C; Future    Joselito was instructed to follow up in the clinic in 6 months for check up or as needed with any medical issues.

## 2024-10-15 NOTE — PROGRESS NOTES
Vaccine Information Sheet, \"Influenza - Inactivated\"  given to Joselito Mendez, or parent/legal guardian of  Joselito Mendez and verbalized understanding.    Patient responses:    Have you ever had a reaction to a flu vaccine? No  Are you able to eat eggs without adverse effects?  Yes  Do you have any current illness?  No  Have you ever had Guillian Blue Eye Syndrome?  No    Flu vaccine given per order. Please see immunization tab.

## 2024-10-15 NOTE — ASSESSMENT & PLAN NOTE
HgbA1C has been running < 6.   HgbA1C in 6 months.      Orders:    Comprehensive Metabolic Panel; Future    Hemoglobin A1C; Future

## 2024-10-15 NOTE — ASSESSMENT & PLAN NOTE
Controlled on Crestor.  No change in medication.  Obtain labs approximately one week prior to the office appointment.  Please fast for 12 hours prior to obtaining the labs.  Water or black coffee (no cream or sugar) is allowed prior to the the labs.      Orders:    Comprehensive Metabolic Panel; Future    Lipid Panel; Future

## 2024-10-22 ENCOUNTER — HOSPITAL ENCOUNTER (OUTPATIENT)
Dept: PHYSICAL THERAPY | Age: 59
Setting detail: THERAPIES SERIES
Discharge: HOME | End: 2024-10-22

## 2024-10-22 NOTE — PROGRESS NOTES
Physical Therapy  [facility]  Rehab and Wellness    Date: 10/22/2024  Patient Name: Joselito Mendez        : 1965       Unable to received a updated expiration date since [employer] has put a denial in.      Iram MACIAS Shock Date: 10/22/2024

## 2024-11-14 RX ORDER — BETAMETHASONE DIPROPIONATE 0.5 MG/G
CREAM TOPICAL
Refills: 0 | OUTPATIENT
Start: 2024-11-14

## 2024-12-02 DIAGNOSIS — I10 ESSENTIAL HYPERTENSION: ICD-10-CM

## 2024-12-02 RX ORDER — BENAZEPRIL HYDROCHLORIDE AND HYDROCHLOROTHIAZIDE 20; 12.5 MG/1; MG/1
TABLET ORAL
Qty: 180 TABLET | Refills: 1 | Status: SHIPPED | OUTPATIENT
Start: 2024-12-02

## 2025-01-12 ENCOUNTER — HOSPITAL ENCOUNTER (EMERGENCY)
Age: 60
Discharge: HOME | End: 2025-01-12
Attending: EMERGENCY MEDICINE
Payer: COMMERCIAL

## 2025-01-12 ENCOUNTER — APPOINTMENT (OUTPATIENT)
Dept: GENERAL RADIOLOGY | Age: 60
End: 2025-01-12
Payer: COMMERCIAL

## 2025-01-12 VITALS
TEMPERATURE: 98.5 F | HEIGHT: 68 IN | SYSTOLIC BLOOD PRESSURE: 152 MMHG | HEART RATE: 75 BPM | OXYGEN SATURATION: 96 % | RESPIRATION RATE: 20 BRPM | WEIGHT: 204 LBS | BODY MASS INDEX: 30.92 KG/M2 | DIASTOLIC BLOOD PRESSURE: 97 MMHG

## 2025-01-12 DIAGNOSIS — S40.012A CONTUSION OF LEFT SHOULDER, INITIAL ENCOUNTER: Primary | ICD-10-CM

## 2025-01-12 PROCEDURE — 73030 X-RAY EXAM OF SHOULDER: CPT

## 2025-01-12 PROCEDURE — 99283 EMERGENCY DEPT VISIT LOW MDM: CPT

## 2025-01-12 RX ORDER — AMOXICILLIN 500 MG
1200 CAPSULE ORAL DAILY
COMMUNITY

## 2025-01-12 ASSESSMENT — PAIN - FUNCTIONAL ASSESSMENT: PAIN_FUNCTIONAL_ASSESSMENT: 0-10

## 2025-01-12 ASSESSMENT — LIFESTYLE VARIABLES
HOW OFTEN DO YOU HAVE A DRINK CONTAINING ALCOHOL: NEVER
HOW MANY STANDARD DRINKS CONTAINING ALCOHOL DO YOU HAVE ON A TYPICAL DAY: PATIENT DOES NOT DRINK

## 2025-01-12 ASSESSMENT — PAIN DESCRIPTION - LOCATION: LOCATION: SHOULDER

## 2025-01-12 ASSESSMENT — PAIN DESCRIPTION - ONSET: ONSET: ON-GOING

## 2025-01-12 ASSESSMENT — PAIN DESCRIPTION - FREQUENCY: FREQUENCY: CONTINUOUS

## 2025-01-12 ASSESSMENT — PAIN DESCRIPTION - ORIENTATION: ORIENTATION: LEFT

## 2025-01-12 ASSESSMENT — PAIN SCALES - GENERAL: PAINLEVEL_OUTOF10: 6

## 2025-01-12 ASSESSMENT — PAIN DESCRIPTION - PAIN TYPE: TYPE: ACUTE PAIN

## 2025-01-12 ASSESSMENT — PAIN DESCRIPTION - DESCRIPTORS: DESCRIPTORS: ACHING

## 2025-01-12 NOTE — ED PROVIDER NOTES
eMERGENCY dEPARTMENT eNCOUnter        CHIEF COMPLAINT    Chief Complaint   Patient presents with    Shoulder Injury     Fell on ice yesterday and landed on left shoulder       HPI    Joselito Mendez is a 59 y.o. male who presents to ED with L shoulder pain. Patient fell on ice yesterday and hurt his L shoulder.    Patient has limited range of motion left shoulder due to pain.  REVIEW OF SYSTEMS    All systems reviewed and positives are in the HPI      PAST MEDICAL HISTORY    Past Medical History:   Diagnosis Date    Elevated LFTs 07/2020    US with fatty liver.  Patiet continues to drink alcohol.      Hx of exercise stress test 2008    negative    Hyperlipidemia     LDL goal 100    Hypertension        SURGICAL HISTORY    Past Surgical History:   Procedure Laterality Date    COLONOSCOPY N/A 01/11/2021     Back:  1 adenomatous polyp    CYST REMOVAL      Left wrist       CURRENT MEDICATIONS    Current Outpatient Rx   Medication Sig Dispense Refill    Omega-3 Fatty Acids (FISH OIL) 1200 MG CAPS Take 1,200 mg by mouth daily      naproxen (NAPROSYN) 375 MG tablet Take 1 tablet by mouth 2 times daily (with meals) 20 tablet 0    benazepril-hydrochlorthiazide (LOTENSIN HCT) 20-12.5 MG per tablet TAKE 1 TABLET IN THE MORNING AND AT BEDTIME 180 tablet 1    rosuvastatin (CRESTOR) 10 MG tablet TAKE 1 TABLET NIGHTLY 90 tablet 3    metoprolol tartrate (LOPRESSOR) 25 MG tablet TAKE 1 TABLET TWICE A  tablet 3    amLODIPine (NORVASC) 5 MG tablet TAKE 1 TABLET DAILY 90 tablet 3    ibuprofen (ADVIL;MOTRIN) 600 MG tablet Take 1 tablet by mouth every 6 hours as needed for Pain 28 tablet 0    methocarbamol (ROBAXIN) 750 MG tablet TAKE 1 TABLET DAILY AS NEEDED FOR BACK PAIN 90 tablet 1       ALLERGIES    No Known Allergies    FAMILY HISTORY    Family History   Problem Relation Age of Onset    Diabetes Mother         DM II    High Cholesterol Mother     High Blood Pressure Mother     Stroke Mother 60    Heart Disease Mother

## 2025-01-14 ENCOUNTER — TELEPHONE (OUTPATIENT)
Dept: FAMILY MEDICINE CLINIC | Age: 60
End: 2025-01-14

## 2025-01-14 NOTE — TELEPHONE ENCOUNTER
[facility] ED Follow up Call    Reason for ED visit:  Shoulder Injury     1/14/2025     FU appts/Provider:    Future Appointments   Date Time Provider Department Center   4/21/2025  3:00 PM Suhail Freeman MD [facility] DEP         [employer] DOCUMENTATION - ERASE IF NOT USED  Hi, this message is for Joselito.  This is HODA FLORES from Suhail Goff office.  Just calling to see how you are doing after your recent visit to the Emergency Room.  Suhail Goff wants to make sure you were able to fill any prescriptions and that you understand your discharge instructions.  Please return our call if you need to make a follow up appointment with your provider or have any further needs.   Our phone number is [phone].  Have a great day.

## 2025-02-10 DIAGNOSIS — M62.830 BACK SPASM: ICD-10-CM

## 2025-02-10 RX ORDER — METHOCARBAMOL 750 MG/1
TABLET, FILM COATED ORAL
Qty: 90 TABLET | Refills: 3 | Status: SHIPPED | OUTPATIENT
Start: 2025-02-10

## 2025-02-10 NOTE — TELEPHONE ENCOUNTER
Last OV 10/15/24     Next OV 4/21/25    Requesting refill on methocarbamol thru [facility]  Rx pending

## 2025-04-17 ENCOUNTER — HOSPITAL ENCOUNTER (OUTPATIENT)
Age: 60
Setting detail: SPECIMEN
Discharge: HOME | End: 2025-04-17
Payer: COMMERCIAL

## 2025-04-17 DIAGNOSIS — E78.2 MIXED HYPERLIPIDEMIA: ICD-10-CM

## 2025-04-17 DIAGNOSIS — I10 ESSENTIAL HYPERTENSION: ICD-10-CM

## 2025-04-17 DIAGNOSIS — R73.01 IFG (IMPAIRED FASTING GLUCOSE): ICD-10-CM

## 2025-04-17 LAB
ALBUMIN SERPL-MCNC: 4.4 G/DL (ref 3.5–5.2)
ALBUMIN/GLOB SERPL: 1.7 {RATIO} (ref 1–2.5)
ALP SERPL-CCNC: 54 U/L (ref 40–129)
ALT SERPL-CCNC: 53 U/L (ref 5–41)
ANION GAP SERPL CALCULATED.3IONS-SCNC: 10 MMOL/L (ref 9–17)
AST SERPL-CCNC: 53 U/L
BILIRUB SERPL-MCNC: 1.5 MG/DL (ref 0.3–1.2)
BUN SERPL-MCNC: 7 MG/DL (ref 8–23)
CALCIUM SERPL-MCNC: 9.6 MG/DL (ref 8.6–10.4)
CHLORIDE SERPL-SCNC: 99 MMOL/L (ref 98–107)
CHOLEST SERPL-MCNC: 148 MG/DL (ref 0–199)
CHOLESTEROL/HDL RATIO: 3.8
CO2 SERPL-SCNC: 30 MMOL/L (ref 20–31)
CREAT SERPL-MCNC: 0.9 MG/DL (ref 0.7–1.2)
EST. AVERAGE GLUCOSE BLD GHB EST-MCNC: 105 MG/DL
GFR, ESTIMATED: >90 ML/MIN/1.73M2
GLUCOSE SERPL-MCNC: 107 MG/DL (ref 70–99)
HBA1C MFR BLD: 5.3 % (ref 4–6)
HDLC SERPL-MCNC: 39 MG/DL
LDLC SERPL CALC-MCNC: 79 MG/DL (ref 0–100)
POTASSIUM SERPL-SCNC: 3.6 MMOL/L (ref 3.7–5.3)
PROT SERPL-MCNC: 7 G/DL (ref 6.4–8.3)
SODIUM SERPL-SCNC: 139 MMOL/L (ref 135–144)
TRIGL SERPL-MCNC: 151 MG/DL
VLDLC SERPL CALC-MCNC: 30 MG/DL (ref 1–30)

## 2025-04-17 PROCEDURE — 83036 HEMOGLOBIN GLYCOSYLATED A1C: CPT

## 2025-04-17 PROCEDURE — 80061 LIPID PANEL: CPT

## 2025-04-17 PROCEDURE — 80053 COMPREHEN METABOLIC PANEL: CPT

## 2025-04-17 PROCEDURE — 36415 COLL VENOUS BLD VENIPUNCTURE: CPT

## 2025-04-18 SDOH — ECONOMIC STABILITY: FOOD INSECURITY: WITHIN THE PAST 12 MONTHS, THE FOOD YOU BOUGHT JUST DIDN'T LAST AND YOU DIDN'T HAVE MONEY TO GET MORE.: NEVER TRUE

## 2025-04-18 SDOH — ECONOMIC STABILITY: INCOME INSECURITY: IN THE LAST 12 MONTHS, WAS THERE A TIME WHEN YOU WERE NOT ABLE TO PAY THE MORTGAGE OR RENT ON TIME?: NO

## 2025-04-18 SDOH — ECONOMIC STABILITY: FOOD INSECURITY: WITHIN THE PAST 12 MONTHS, YOU WORRIED THAT YOUR FOOD WOULD RUN OUT BEFORE YOU GOT MONEY TO BUY MORE.: NEVER TRUE

## 2025-04-18 ASSESSMENT — PATIENT HEALTH QUESTIONNAIRE - PHQ9
SUM OF ALL RESPONSES TO PHQ QUESTIONS 1-9: 0
SUM OF ALL RESPONSES TO PHQ QUESTIONS 1-9: 0
SUM OF ALL RESPONSES TO PHQ9 QUESTIONS 1 & 2: 0
1. LITTLE INTEREST OR PLEASURE IN DOING THINGS: NOT AT ALL
SUM OF ALL RESPONSES TO PHQ QUESTIONS 1-9: 0
1. LITTLE INTEREST OR PLEASURE IN DOING THINGS: NOT AT ALL
2. FEELING DOWN, DEPRESSED OR HOPELESS: NOT AT ALL
SUM OF ALL RESPONSES TO PHQ QUESTIONS 1-9: 0
2. FEELING DOWN, DEPRESSED OR HOPELESS: NOT AT ALL

## 2025-04-21 ENCOUNTER — OFFICE VISIT (OUTPATIENT)
Dept: FAMILY MEDICINE CLINIC | Age: 60
End: 2025-04-21
Payer: COMMERCIAL

## 2025-04-21 VITALS
WEIGHT: 198 LBS | BODY MASS INDEX: 31.82 KG/M2 | HEART RATE: 86 BPM | DIASTOLIC BLOOD PRESSURE: 88 MMHG | SYSTOLIC BLOOD PRESSURE: 139 MMHG | HEIGHT: 66 IN

## 2025-04-21 DIAGNOSIS — M72.2 PLANTAR FASCIITIS: ICD-10-CM

## 2025-04-21 DIAGNOSIS — E78.2 MIXED HYPERLIPIDEMIA: ICD-10-CM

## 2025-04-21 DIAGNOSIS — R79.89 ELEVATED LFTS: ICD-10-CM

## 2025-04-21 DIAGNOSIS — R73.01 IFG (IMPAIRED FASTING GLUCOSE): ICD-10-CM

## 2025-04-21 DIAGNOSIS — I10 ESSENTIAL HYPERTENSION: ICD-10-CM

## 2025-04-21 DIAGNOSIS — Z12.5 PROSTATE CANCER SCREENING: ICD-10-CM

## 2025-04-21 DIAGNOSIS — Z00.00 WELLNESS EXAMINATION: Primary | ICD-10-CM

## 2025-04-21 DIAGNOSIS — E87.6 HYPOKALEMIA: ICD-10-CM

## 2025-04-21 PROCEDURE — 99396 PREV VISIT EST AGE 40-64: CPT | Performed by: INTERNAL MEDICINE

## 2025-04-21 PROCEDURE — 3079F DIAST BP 80-89 MM HG: CPT | Performed by: INTERNAL MEDICINE

## 2025-04-21 PROCEDURE — 3075F SYST BP GE 130 - 139MM HG: CPT | Performed by: INTERNAL MEDICINE

## 2025-04-21 RX ORDER — POTASSIUM CHLORIDE 750 MG/1
10 TABLET, EXTENDED RELEASE ORAL DAILY
Qty: 90 TABLET | Refills: 3 | Status: SHIPPED | OUTPATIENT
Start: 2025-04-21

## 2025-04-21 ASSESSMENT — ENCOUNTER SYMPTOMS
BLOOD IN STOOL: 0
ABDOMINAL PAIN: 0
RESPIRATORY NEGATIVE: 1
CONSTIPATION: 0
NAUSEA: 0
SHORTNESS OF BREATH: 0
DIARRHEA: 0
SORE THROAT: 0

## 2025-04-21 NOTE — ASSESSMENT & PLAN NOTE
Controlled on statin. No change in dose.  Obtain labs approximately one week prior to the office appointment.  Please fast for 12 hours prior to obtaining the labs.  Water or black coffee (no cream or sugar) is allowed prior to the the labs.      Orders:    Lipid Panel; Future

## 2025-04-21 NOTE — ASSESSMENT & PLAN NOTE
Controlled on Amlodipine, Lotensin HCT, and Lopressor.  No change in medication.    Orders:    Comprehensive Metabolic Panel; Future

## 2025-04-21 NOTE — PATIENT INSTRUCTIONS
Survey:     You may be receiving a survey from [employer] regarding your visit today.     You may get this in the mail, through your MyChart or in your email.      Please complete the survey to enable us to provide the highest quality of care to you and your family. Please also, mention our names.     If you cannot score us as very good (5 Stars) on any question, please feel free to call the office to discuss how we could have made your experience exceptional.      Thank You!        Dr. Freeman, MD Felix, CANDI Goyal, CECILE Lopez, APRN DUANE Nicole, CMA    [place], CMA       Assessment & Plan  Essential hypertension   Controlled on Amlodipine, Lotensin HCT, and Lopressor.  No change in medication.    Orders:    Comprehensive Metabolic Panel; Future    Elevated LFTs   Secondary to fatty liver.  Avoid all processed carbohydrates.  Avoid vegetable oil and seed oils.  Use olive oil, coconut oil, butter, lard, or Talo.   Avoid fruit juices or high fructose corn syrups.  Obtain labs approximately one week prior to the office appointment.  Please fast for 12 hours prior to obtaining the labs.  Water or black coffee (no cream or sugar) is allowed prior to the the labs.  Avoid alcoholic beverages.      Orders:    Comprehensive Metabolic Panel; Future    Hypokalemia   Add Klor con 10 meq by mouth daily.      Orders:    potassium chloride (KLOR-CON M) 10 MEQ extended release tablet; Take 1 tablet by mouth daily    IFG (impaired fasting glucose)   HgbA1C has been normal but strong family history.     Orders:    Comprehensive Metabolic Panel; Future    Hemoglobin A1C; Future    Plantar fasciitis   Recommend avoiding flip flops and wearing supportive shoe wear.          Prostate cancer screening   PSA with next labs.    Orders:    PSA Screening; Future    Mixed hyperlipidemia   Controlled on statin. No change in dose.  Obtain labs approximately one week prior to the office appointment.  Please fast for 12 hours prior to

## 2025-04-21 NOTE — ASSESSMENT & PLAN NOTE
Secondary to fatty liver.  Avoid all processed carbohydrates.  Avoid vegetable oil and seed oils.  Use olive oil, coconut oil, butter, lard, or Talo.   Avoid fruit juices or high fructose corn syrups.  Obtain labs approximately one week prior to the office appointment.  Please fast for 12 hours prior to obtaining the labs.  Water or black coffee (no cream or sugar) is allowed prior to the the labs.  Avoid alcoholic beverages.      Orders:    Comprehensive Metabolic Panel; Future

## 2025-04-21 NOTE — PROGRESS NOTES
Joselito Mendez (:  1965) is a 59 y.o. male,Established patient, here for evaluation of the following chief complaint(s):  Annual Exam (Check up. Discuss labs. ), Hypertension, Hyperlipidemia, Foot Pain (Left foot arch pain, started yesterday. ), and Tinnitus (C/o ringing in ears. )         Assessment & Plan  Wellness examination   Work on wt loss and exercise.  Discussed diet modification.     Orders:    Comprehensive Metabolic Panel; Future    Hemoglobin A1C; Future    Lipid Panel; Future    PSA Screening; Future    Essential hypertension   Controlled on Amlodipine, Lotensin HCT, and Lopressor.  No change in medication.    Orders:    Comprehensive Metabolic Panel; Future    Elevated LFTs   Secondary to fatty liver.  Avoid all processed carbohydrates.  Avoid vegetable oil and seed oils.  Use olive oil, coconut oil, butter, lard, or Talo.   Avoid fruit juices or high fructose corn syrups.  Obtain labs approximately one week prior to the office appointment.  Please fast for 12 hours prior to obtaining the labs.  Water or black coffee (no cream or sugar) is allowed prior to the the labs.  Avoid alcoholic beverages.      Orders:    Comprehensive Metabolic Panel; Future    Hypokalemia   Add Klor con 10 meq by mouth daily.      Orders:    potassium chloride (KLOR-CON M) 10 MEQ extended release tablet; Take 1 tablet by mouth daily    IFG (impaired fasting glucose)   HgbA1C has been normal but strong family history.     Orders:    Comprehensive Metabolic Panel; Future    Hemoglobin A1C; Future    Plantar fasciitis   Recommend avoiding flip flops and wearing supportive shoe wear.   Exercises printed off.        Prostate cancer screening   PSA with next labs.    Orders:    PSA Screening; Future    Mixed hyperlipidemia   Controlled on statin. No change in dose.  Obtain labs approximately one week prior to the office appointment.  Please fast for 12 hours prior to obtaining the labs.  Water or black coffee (no

## 2025-04-21 NOTE — ASSESSMENT & PLAN NOTE
HgbA1C has been normal but strong family history.     Orders:    Comprehensive Metabolic Panel; Future    Hemoglobin A1C; Future

## 2025-05-08 DIAGNOSIS — E78.2 MIXED HYPERCHOLESTEROLEMIA AND HYPERTRIGLYCERIDEMIA: ICD-10-CM

## 2025-05-08 DIAGNOSIS — I10 ESSENTIAL HYPERTENSION: ICD-10-CM

## 2025-05-08 RX ORDER — AMLODIPINE BESYLATE 5 MG/1
5 TABLET ORAL DAILY
Qty: 90 TABLET | Refills: 3 | Status: SHIPPED | OUTPATIENT
Start: 2025-05-08

## 2025-05-08 RX ORDER — ROSUVASTATIN CALCIUM 10 MG/1
10 TABLET, COATED ORAL NIGHTLY
Qty: 90 TABLET | Refills: 3 | Status: SHIPPED | OUTPATIENT
Start: 2025-05-08

## 2025-05-08 RX ORDER — METOPROLOL TARTRATE 25 MG/1
25 TABLET, FILM COATED ORAL 2 TIMES DAILY
Qty: 180 TABLET | Refills: 3 | Status: SHIPPED | OUTPATIENT
Start: 2025-05-08

## 2025-05-08 NOTE — TELEPHONE ENCOUNTER
Last OV 4/21/25     Next OV 10/30/25    Requesting refills on amlodipine, metoprolol, and rosuvastatin thru [facility]  Rx's pending

## 2025-05-30 DIAGNOSIS — I10 ESSENTIAL HYPERTENSION: ICD-10-CM

## 2025-05-30 RX ORDER — BENAZEPRIL HYDROCHLORIDE AND HYDROCHLOROTHIAZIDE 20; 12.5 MG/1; MG/1
TABLET ORAL
Qty: 180 TABLET | Refills: 3 | Status: SHIPPED | OUTPATIENT
Start: 2025-05-30

## (undated) DEVICE — FORCEPS BX L240CM JAW DIA2.2MM RAD JAW 4 HOT DISP

## (undated) DEVICE — GOWN,AURORA,NONRNF,XL,30/CS: Brand: MEDLINE

## (undated) DEVICE — MEDI-VAC NON-CONDUCTIVE SUCTION TUBING 6MM X 6.1M (20 FT.) L: Brand: CARDINAL HEALTH

## (undated) DEVICE — JELLY LUBRICATING 4OZ FLIP TOP TB E Z